# Patient Record
Sex: MALE | Race: OTHER | NOT HISPANIC OR LATINO | URBAN - METROPOLITAN AREA
[De-identification: names, ages, dates, MRNs, and addresses within clinical notes are randomized per-mention and may not be internally consistent; named-entity substitution may affect disease eponyms.]

---

## 2023-11-20 ENCOUNTER — INPATIENT (INPATIENT)
Facility: HOSPITAL | Age: 68
LOS: 1 days | Discharge: HOME HEALTH | DRG: 603 | End: 2023-11-22
Attending: INTERNAL MEDICINE | Admitting: INTERNAL MEDICINE
Payer: MEDICARE

## 2023-11-20 VITALS
TEMPERATURE: 98 F | SYSTOLIC BLOOD PRESSURE: 131 MMHG | OXYGEN SATURATION: 98 % | HEART RATE: 82 BPM | RESPIRATION RATE: 18 BRPM | DIASTOLIC BLOOD PRESSURE: 69 MMHG | WEIGHT: 207.9 LBS

## 2023-11-20 DIAGNOSIS — R65.10 SYSTEMIC INFLAMMATORY RESPONSE SYNDROME (SIRS) OF NON-INFECTIOUS ORIGIN WITHOUT ACUTE ORGAN DYSFUNCTION: ICD-10-CM

## 2023-11-20 DIAGNOSIS — Z86.39 PERSONAL HISTORY OF OTHER ENDOCRINE, NUTRITIONAL AND METABOLIC DISEASE: ICD-10-CM

## 2023-11-20 DIAGNOSIS — C61 MALIGNANT NEOPLASM OF PROSTATE: ICD-10-CM

## 2023-11-20 DIAGNOSIS — I10 ESSENTIAL (PRIMARY) HYPERTENSION: ICD-10-CM

## 2023-11-20 DIAGNOSIS — N18.9 CHRONIC KIDNEY DISEASE, UNSPECIFIED: ICD-10-CM

## 2023-11-20 DIAGNOSIS — M79.89 OTHER SPECIFIED SOFT TISSUE DISORDERS: ICD-10-CM

## 2023-11-20 DIAGNOSIS — L03.90 CELLULITIS, UNSPECIFIED: ICD-10-CM

## 2023-11-20 DIAGNOSIS — R09.89 OTHER SPECIFIED SYMPTOMS AND SIGNS INVOLVING THE CIRCULATORY AND RESPIRATORY SYSTEMS: ICD-10-CM

## 2023-11-20 DIAGNOSIS — Z29.9 ENCOUNTER FOR PROPHYLACTIC MEASURES, UNSPECIFIED: ICD-10-CM

## 2023-11-20 DIAGNOSIS — Z90.79 ACQUIRED ABSENCE OF OTHER GENITAL ORGAN(S): Chronic | ICD-10-CM

## 2023-11-20 DIAGNOSIS — I89.0 LYMPHEDEMA, NOT ELSEWHERE CLASSIFIED: ICD-10-CM

## 2023-11-20 LAB
ANION GAP SERPL CALC-SCNC: 10 MMOL/L — SIGNIFICANT CHANGE UP (ref 5–17)
ANION GAP SERPL CALC-SCNC: 10 MMOL/L — SIGNIFICANT CHANGE UP (ref 5–17)
BUN SERPL-MCNC: 27 MG/DL — HIGH (ref 7–23)
BUN SERPL-MCNC: 27 MG/DL — HIGH (ref 7–23)
CALCIUM SERPL-MCNC: 9.9 MG/DL — SIGNIFICANT CHANGE UP (ref 8.4–10.5)
CALCIUM SERPL-MCNC: 9.9 MG/DL — SIGNIFICANT CHANGE UP (ref 8.4–10.5)
CHLORIDE SERPL-SCNC: 106 MMOL/L — SIGNIFICANT CHANGE UP (ref 96–108)
CHLORIDE SERPL-SCNC: 106 MMOL/L — SIGNIFICANT CHANGE UP (ref 96–108)
CO2 SERPL-SCNC: 21 MMOL/L — LOW (ref 22–31)
CO2 SERPL-SCNC: 21 MMOL/L — LOW (ref 22–31)
CREAT SERPL-MCNC: 1.25 MG/DL — SIGNIFICANT CHANGE UP (ref 0.5–1.3)
CREAT SERPL-MCNC: 1.25 MG/DL — SIGNIFICANT CHANGE UP (ref 0.5–1.3)
CRP SERPL-MCNC: 105.3 MG/L — HIGH (ref 0–4)
CRP SERPL-MCNC: 105.3 MG/L — HIGH (ref 0–4)
EGFR: 62 ML/MIN/1.73M2 — SIGNIFICANT CHANGE UP
EGFR: 62 ML/MIN/1.73M2 — SIGNIFICANT CHANGE UP
GLUCOSE SERPL-MCNC: 140 MG/DL — HIGH (ref 70–99)
GLUCOSE SERPL-MCNC: 140 MG/DL — HIGH (ref 70–99)
HCT VFR BLD CALC: 36.8 % — LOW (ref 39–50)
HCT VFR BLD CALC: 36.8 % — LOW (ref 39–50)
HGB BLD-MCNC: 12.2 G/DL — LOW (ref 13–17)
HGB BLD-MCNC: 12.2 G/DL — LOW (ref 13–17)
MCHC RBC-ENTMCNC: 31 PG — SIGNIFICANT CHANGE UP (ref 27–34)
MCHC RBC-ENTMCNC: 31 PG — SIGNIFICANT CHANGE UP (ref 27–34)
MCHC RBC-ENTMCNC: 33.2 GM/DL — SIGNIFICANT CHANGE UP (ref 32–36)
MCHC RBC-ENTMCNC: 33.2 GM/DL — SIGNIFICANT CHANGE UP (ref 32–36)
MCV RBC AUTO: 93.4 FL — SIGNIFICANT CHANGE UP (ref 80–100)
MCV RBC AUTO: 93.4 FL — SIGNIFICANT CHANGE UP (ref 80–100)
NRBC # BLD: 0 /100 WBCS — SIGNIFICANT CHANGE UP (ref 0–0)
NRBC # BLD: 0 /100 WBCS — SIGNIFICANT CHANGE UP (ref 0–0)
PLATELET # BLD AUTO: 406 K/UL — HIGH (ref 150–400)
PLATELET # BLD AUTO: 406 K/UL — HIGH (ref 150–400)
POTASSIUM SERPL-MCNC: 4.9 MMOL/L — SIGNIFICANT CHANGE UP (ref 3.5–5.3)
POTASSIUM SERPL-MCNC: 4.9 MMOL/L — SIGNIFICANT CHANGE UP (ref 3.5–5.3)
POTASSIUM SERPL-SCNC: 4.9 MMOL/L — SIGNIFICANT CHANGE UP (ref 3.5–5.3)
POTASSIUM SERPL-SCNC: 4.9 MMOL/L — SIGNIFICANT CHANGE UP (ref 3.5–5.3)
RBC # BLD: 3.94 M/UL — LOW (ref 4.2–5.8)
RBC # BLD: 3.94 M/UL — LOW (ref 4.2–5.8)
RBC # FLD: 14.2 % — SIGNIFICANT CHANGE UP (ref 10.3–14.5)
RBC # FLD: 14.2 % — SIGNIFICANT CHANGE UP (ref 10.3–14.5)
SODIUM SERPL-SCNC: 137 MMOL/L — SIGNIFICANT CHANGE UP (ref 135–145)
SODIUM SERPL-SCNC: 137 MMOL/L — SIGNIFICANT CHANGE UP (ref 135–145)
WBC # BLD: 13 K/UL — HIGH (ref 3.8–10.5)
WBC # BLD: 13 K/UL — HIGH (ref 3.8–10.5)
WBC # FLD AUTO: 13 K/UL — HIGH (ref 3.8–10.5)
WBC # FLD AUTO: 13 K/UL — HIGH (ref 3.8–10.5)

## 2023-11-20 PROCEDURE — 93971 EXTREMITY STUDY: CPT | Mod: 26,LT

## 2023-11-20 PROCEDURE — 99285 EMERGENCY DEPT VISIT HI MDM: CPT

## 2023-11-20 PROCEDURE — 99223 1ST HOSP IP/OBS HIGH 75: CPT | Mod: GC

## 2023-11-20 PROCEDURE — 73701 CT LOWER EXTREMITY W/DYE: CPT | Mod: 26,LT,MA

## 2023-11-20 RX ORDER — HEPARIN SODIUM 5000 [USP'U]/ML
5000 INJECTION INTRAVENOUS; SUBCUTANEOUS EVERY 8 HOURS
Refills: 0 | Status: DISCONTINUED | OUTPATIENT
Start: 2023-11-20 | End: 2023-11-22

## 2023-11-20 RX ORDER — INFLUENZA VIRUS VACCINE 15; 15; 15; 15 UG/.5ML; UG/.5ML; UG/.5ML; UG/.5ML
0.7 SUSPENSION INTRAMUSCULAR ONCE
Refills: 0 | Status: DISCONTINUED | OUTPATIENT
Start: 2023-11-20 | End: 2023-11-22

## 2023-11-20 RX ORDER — SPIRONOLACTONE 25 MG/1
1 TABLET, FILM COATED ORAL
Refills: 0 | DISCHARGE

## 2023-11-20 RX ORDER — VANCOMYCIN HCL 1 G
1250 VIAL (EA) INTRAVENOUS ONCE
Refills: 0 | Status: COMPLETED | OUTPATIENT
Start: 2023-11-20 | End: 2023-11-20

## 2023-11-20 RX ORDER — METOPROLOL TARTRATE 50 MG
0 TABLET ORAL
Refills: 0 | DISCHARGE

## 2023-11-20 RX ORDER — SPIRONOLACTONE 25 MG/1
0 TABLET, FILM COATED ORAL
Refills: 0 | DISCHARGE

## 2023-11-20 RX ORDER — NIFEDIPINE 30 MG
2 TABLET, EXTENDED RELEASE 24 HR ORAL
Refills: 0 | DISCHARGE

## 2023-11-20 RX ORDER — SODIUM CHLORIDE 9 MG/ML
1000 INJECTION INTRAMUSCULAR; INTRAVENOUS; SUBCUTANEOUS ONCE
Refills: 0 | Status: COMPLETED | OUTPATIENT
Start: 2023-11-20 | End: 2023-11-20

## 2023-11-20 RX ORDER — PIPERACILLIN AND TAZOBACTAM 4; .5 G/20ML; G/20ML
3.38 INJECTION, POWDER, LYOPHILIZED, FOR SOLUTION INTRAVENOUS EVERY 8 HOURS
Refills: 0 | Status: DISCONTINUED | OUTPATIENT
Start: 2023-11-20 | End: 2023-11-22

## 2023-11-20 RX ORDER — METOPROLOL TARTRATE 50 MG
1 TABLET ORAL
Refills: 0 | DISCHARGE

## 2023-11-20 RX ORDER — SPIRONOLACTONE 25 MG/1
100 TABLET, FILM COATED ORAL DAILY
Refills: 0 | Status: DISCONTINUED | OUTPATIENT
Start: 2023-11-21 | End: 2023-11-22

## 2023-11-20 RX ORDER — VANCOMYCIN HCL 1 G
1500 VIAL (EA) INTRAVENOUS EVERY 12 HOURS
Refills: 0 | Status: COMPLETED | OUTPATIENT
Start: 2023-11-20 | End: 2023-11-21

## 2023-11-20 RX ORDER — PIPERACILLIN AND TAZOBACTAM 4; .5 G/20ML; G/20ML
3.38 INJECTION, POWDER, LYOPHILIZED, FOR SOLUTION INTRAVENOUS ONCE
Refills: 0 | Status: COMPLETED | OUTPATIENT
Start: 2023-11-20 | End: 2023-11-20

## 2023-11-20 RX ORDER — ASPIRIN/CALCIUM CARB/MAGNESIUM 324 MG
1 TABLET ORAL
Refills: 0 | DISCHARGE

## 2023-11-20 RX ADMIN — Medication 300 MILLIGRAM(S): at 23:05

## 2023-11-20 RX ADMIN — PIPERACILLIN AND TAZOBACTAM 25 GRAM(S): 4; .5 INJECTION, POWDER, LYOPHILIZED, FOR SOLUTION INTRAVENOUS at 21:11

## 2023-11-20 RX ADMIN — HEPARIN SODIUM 5000 UNIT(S): 5000 INJECTION INTRAVENOUS; SUBCUTANEOUS at 21:12

## 2023-11-20 RX ADMIN — SODIUM CHLORIDE 1000 MILLILITER(S): 9 INJECTION INTRAMUSCULAR; INTRAVENOUS; SUBCUTANEOUS at 10:25

## 2023-11-20 RX ADMIN — PIPERACILLIN AND TAZOBACTAM 200 GRAM(S): 4; .5 INJECTION, POWDER, LYOPHILIZED, FOR SOLUTION INTRAVENOUS at 10:25

## 2023-11-20 RX ADMIN — Medication 166.67 MILLIGRAM(S): at 11:42

## 2023-11-20 NOTE — H&P ADULT - PROBLEM/PLAN-6
PAST SURGICAL HISTORY:  No significant past surgical history     No significant past surgical history       
DISPLAY PLAN FREE TEXT

## 2023-11-20 NOTE — H&P ADULT - ASSESSMENT
69yo male with PMHx chronic lymphadema, prostate ca (s/p proastectomy, radiation and hormone therapy), HTN, hyperaldosteronism (endo Dr. Suad Feldman) presents with ~1 week left lower leg swelling, erythema and tenderness, admitted for management of cellulitis.

## 2023-11-20 NOTE — H&P ADULT - PROBLEM SELECTOR PLAN 3
1/4 SIRS (leukocytosis) likely in setting of cellulitis. Reportedly had fever at home however no fever in ED    - f/u blood cultures  - abx as above

## 2023-11-20 NOTE — ED PROVIDER NOTE - CLINICAL SUMMARY MEDICAL DECISION MAKING FREE TEXT BOX
Pt c/o fever ~ 1 wk ago, afebrile in ed w LLE swelling, drainage.  LLE w cellulitis and bullae, poss crepitus on exam - exam concerning for ?able nec fasciitis 2/2 bullae and poss crepitus but well appearing, no ttp or c/o pain and no fever which is inconsistent.  Pt ordered for abx for skin infection; plan labs, ct LLE, admit for iv abx after failing outpt abx.

## 2023-11-20 NOTE — ED ADULT NURSE NOTE - NSFALLHARMRISKINTERV_ED_ALL_ED

## 2023-11-20 NOTE — PATIENT PROFILE ADULT - FUNCTIONAL ASSESSMENT - DAILY ACTIVITY 5.
Alert-The patient is alert, awake and responds to voice. The patient is oriented to time, place, and person. The triage nurse is able to obtain subjective information. 4 = No assist / stand by assistance

## 2023-11-20 NOTE — H&P ADULT - HISTORY OF PRESENT ILLNESS
CC: left leg swelling, pain and redness  HPI: 69yo male with PMHx chronic lymphadema, prostate ca (s/p proastectomy, radiation and hormone therapy), HTN, hyperaldosteronism (endo Dr. Suad Feldman) presents with ~1 week left lower leg swelling, erythema and tenderness. Pt reports normally wears compression stockings and notes possibly some trauma from removing his left stocking at some point resulting in left lower medial leg wound however also developed large bullae with yellow filled fluid which has since popped. Endorsed fever and chills last week and went to urgentcare on saturday and was prescribed keflex which patient took for 2 days and reports some improvement of swelling however came to ED since patient expected more improvement on antibiotics. Currently no fever or chills and believes that the antibiotics given in ED are helping with swelling. Denies headache, dizziness, chest pain, SOB, abd pain, dysuria.    In the ED:    - VS: Tmax: 97.3, HR: 83, BP: 113/57, RR: 18, O2: 99% room air     - Pertinent Labs: Hb 13, Hb 12.2, bicarb 21, BUN 27, Cr 1.25    - Imaging: CT left LE: Subcutaneous soft tissue edema throughout the left lower extremity that is most prominent at the level of the ankle. Possibly related to cellulitis. No encapsulated peripherally enhancing fluid collection. No soft tissue gas to suggest the presence of necrotizing fasciitis.    - Treatment/interventions: vanc 1250mg, zosyn 3.375g, 1L NS

## 2023-11-20 NOTE — H&P ADULT - PROBLEM SELECTOR PLAN 4
History chronic lymphedema and wears compression stockings daily however has not been able to fit left compression stockings due to swelling    - Vascular consult given severe left lower leg swelling with history of lymphedema  - bilateral leg elevation and continue right leg compression stockings  - DVT left lower leg History chronic lymphedema and wears compression stockings daily however has not been able to fit left compression stockings due to swelling    - Vascular consult given severe left lower leg swelling with history of lymphedema  - bilateral leg elevation and continue right leg compression stockings and ACE bandage of left  - DVT left lower leg

## 2023-11-20 NOTE — H&P ADULT - ATTENDING COMMENTS
Patient presenting with left leg swelling and discharge. Denies fevers, no chills, reports chronic LLE lymphedema, he notice bullae after a cut about a week ago, 2 days ago he went to urgent care was prescribed Keflex. In view of persistent symptoms, he presented to ED  General: AOX3, NAD, lying in stretcher, speaking in full sentences, no labored breathing on RA  HEENT: AT/NC, no facial asymmetry  Abdomen: soft, non-tender  Extremities: Left leg edema, with erythema and warmth, no crepitus to palpation, no tenderness, no focal deficit.       Labs, imaging reviewed    Purulent cellulitis  Lymphedema  Prostate CA    Continue on Pip/Tazo/Vancomycin, get ESR/CRP, Vascular evaluation, wound care per Vascular. Follow-up Bcx. Doppler US LE  Monitor creatinine  Medication reconciliation, avoid BP overcontrol  DVT ppx

## 2023-11-20 NOTE — H&P ADULT - PROBLEM SELECTOR PLAN 2
~1 week of significant left leg swelling, 2-3+ pitting edema likely 2/2 cellulitis in addition to history of lymphadema however pt notes has been less physically active with less ambulation and has history of malignancy    - Duplex left lower leg to r/o DVT ~1 week of significant left leg swelling, 2-3+ pitting edema likely 2/2 cellulitis in addition to history of lymphedema however pt notes has been less physically active with less ambulation and has history of malignancy    - Duplex left lower leg to r/o DVT  - vascular consult  - leg elevation ~1 week of significant left leg swelling, 2-3+ pitting edema likely 2/2 cellulitis in addition to history of lymphedema however pt notes has been less physically active with less ambulation and has history of malignancy    - Duplex left lower leg to r/o DVT  - vascular consult  - leg elevation and continue compression stockings for right leg ~1 week of significant left leg swelling, 2-3+ pitting edema likely 2/2 cellulitis in addition to history of lymphedema however pt notes has been less physically active with less ambulation and has history of malignancy    - Duplex left lower leg to r/o DVT  - vascular consult  - leg elevation and continue compression stockings for right leg and ACE bandage of left

## 2023-11-20 NOTE — H&P ADULT - NSHPPHYSICALEXAM_GEN_ALL_CORE
PHYSICAL EXAM:  GENERAL: Pt lying in bed comfortably in NAD  HEAD:  Atraumatic   EYES: EOMI, PERRL, conjunctiva and sclera clear  ENT: Moist mucous membranes  NECK: Supple, No JVD  CHEST/LUNG: Clear to auscultation bilaterally; No rales, rhonchi, wheezing or rubs. Unlabored respirations  HEART: Regular rate and rhythm; No murmurs, rubs, or gallops  ABDOMEN: Bowel sounds present; Soft, Nontender, Nondistended. No guarding or rigidity    EXTREMITIES:  2+ Peripheral Pulses, brisk capillary refill. No clubbing, cyanosis, or edema  NERVOUS SYSTEM:  Alert & Oriented X3, speech clear. Answers questions appropriately. Facial movements symmetrical, no facial droop, tongue protrusion midline. Full and equal 5/5 strength B/L upper and lower extremities. Sensation intact. No motor drift. No deficits   MSK: FROM x 4 extremities   SKIN: left lower leg erythema, warmth, and mild tenderness from foot to knee, left lower medial leg with 2cm x 4cm superficial pink healing wound and superior with area of popped previous bullae with surrounding 1cm x 1cm small yellow filled bullae

## 2023-11-20 NOTE — ED PROVIDER NOTE - NSICDXPASTMEDICALHX_GEN_ALL_CORE_FT
PAST MEDICAL HISTORY:  H/O hyperaldosteronism     HTN (hypertension)     Lymphedema     Prostate cancer

## 2023-11-20 NOTE — ED PROVIDER NOTE - PHYSICAL EXAMINATION
VITAL SIGNS: I have reviewed nursing notes and confirm.  CONSTITUTIONAL:  in no acute distress.   SKIN:  warm and dry, no acute rash.   HEAD:  normocephalic, atraumatic.  EYES: PERRL, EOM intact; conjunctiva and sclera clear.  ENT: No nasal discharge; airway clear.   NECK: Supple; non tender.  CARD: S1, S2 normal; no murmurs, gallops, or rubs. Regular rate and rhythm.   RESP:  Clear to auscultation b/l, no wheezes, rales or rhonchi.  ABD: Normal bowel sounds; soft; non-distended; non-tender; no guarding/ rebound.  MSK: Normal ROM. No clubbing, cyanosis. no ttp bilat le LLE - dp 1+, mild 5 x8 cm patch of skin peeling mid/lower medial thigh, 4+ edema knee to foot, erythema, warmth w/o ttp 1/3 below knee to foot, scattered bullae medial and ant lower 1/2 of lower leg, + serous drainage, ?able crepitus, no ttp, + from  NEURO: Alert, oriented, grossly unremarkable  PSYCH: Cooperative, mood and affect appropriate.

## 2023-11-20 NOTE — H&P ADULT - PROBLEM SELECTOR PLAN 9
Fluids: s/p 1L NS  Electrolytes: Replete to keep K>4 and Mg>2  Nutrition: DASH  DVT ppx: Heparin  Dispo: F

## 2023-11-20 NOTE — H&P ADULT - PROBLEM SELECTOR PLAN 7
On home nifedipine 120mg daily and metoprolol. Normotensive at this time    - Restart home BP needed, normotensive at this time On home nifedipine 120mg daily and metoprolol. Normotensive at this time    - Restart home BP as needed, normotensive at this time On home nifedipine 120mg daily and metoprolol. Normotensive at this time    - Restart home BP as needed, normotensive at this time    #Alcohol use disorder  Drinks 1-2L wine regularly however in past week has not had any alcohol. No withdrawal symptoms and never had withdrawals in past. Low suspicion for withdrawal as last drank 1 week ago  - Education and counseling provided on alcohol reduction On home nifedipine 120mg daily and metoprolol succ 25mg daily. Normotensive at this time    - Restart home BP as needed, normotensive at this time    #Alcohol use disorder  Drinks 1-2L wine regularly however in past week has not had any alcohol. No withdrawal symptoms and never had withdrawals in past. Low suspicion for withdrawal as last drank 1 week ago  - Education and counseling provided on alcohol reduction

## 2023-11-20 NOTE — H&P ADULT - PROBLEM SELECTOR PLAN 8
Pt reports was told his creatinine has been elevated in past however unsure of baseline. Cr 1.25 on admission. Pt endorses poor PO intake over last week. Likely dehydrated. Received 1L NS    - Trend Cr  - Avoid nephrotoxic medication  - Encourage PO hydration and intake

## 2023-11-20 NOTE — ED ADULT TRIAGE NOTE - CHIEF COMPLAINT QUOTE
pt w/ hx lymphedema presents to ER c/o L lower extremity redness, swelling, with open wound for the past week. states he had a fever a week ago but denies any fevers over the past week.

## 2023-11-20 NOTE — H&P ADULT - NSHPSOCIALHISTORY_GEN_ALL_CORE
Lives at home with wife. No smoking. Drinks 1-2L wine daily however has not drank any wine in past week. Smokes marijuana. Currently works as  for construction.

## 2023-11-20 NOTE — H&P ADULT - PROBLEM SELECTOR PLAN 6
On home spironolactone. Follows endo Dr. Suad Feldman    - continue spironolactone On home spironolactone 100mg daily. Follows tracey Feldman    - continue spironolactone 100mg daily

## 2023-11-20 NOTE — H&P ADULT - NSHPLABSRESULTS_GEN_ALL_CORE
LABS:  casandra                        12.2   13.00 )-----------( 406      ( 20 Nov 2023 10:07 )             36.8     11-20    137  |  106  |  27<H>  ----------------------------<  140<H>  4.9   |  21<L>  |  1.25    Ca    9.9      20 Nov 2023 10:07

## 2023-11-20 NOTE — H&P ADULT - PROBLEM SELECTOR PLAN 5
s/p proastectomy, radiation and hormone therapy, follows a urologist at [facility]    - outpatient follow up

## 2023-11-20 NOTE — PATIENT PROFILE ADULT - MONEY FOR FOOD
At rad onc consult, spoke with pt and son re: ACS programs, resources, and role of ACS navigator. Pt stated that he has no needs at this time but would follow up if needs arise. Provided with ACS navigator's contact information.
no

## 2023-11-20 NOTE — H&P ADULT - PROBLEM SELECTOR PLAN 1
~1 week duration of left lower leg swelling, erythema, tenderness with fever and chills, started on keflex from urgentcare for past 2 days presenting with persistent swelling, erythema, and pain. On exam left leg with 2-3+ pitting edema with significant erythema and warmth with left lower medial leg multiple healing wound with small bullae with yellow fluid (previous very large bullae with yellow fluid that has since popped). No fever or chills at this time and meeting 1/4 SIRS criteria (leukocytosis). Given vanc and zosyn in ED    - continue vanc 1250mg q12h and zosyn 3.375g q8h  - follow up blood culture  - duplex left lower leg ~1 week duration of left lower leg swelling, erythema with fever and chills, started on keflex from urgentcare for past 2 days presenting with persistent swelling, erythema, and pain. On exam left leg with 2-3+ pitting edema with significant erythema and warmth with left lower medial leg multiple healing wound with small bullae with yellow fluid (previous very large bullae with yellow fluid that has since popped). No fever or chills at this time and meeting 1/4 SIRS criteria (leukocytosis). Given vanc and zosyn in ED    - continue vanc 1250mg q12h and zosyn 3.375g q8h  - follow up blood culture  - duplex left lower leg ~1 week duration of left lower leg swelling, erythema with fever and chills, started on keflex from urgentcare for past 2 days presenting with persistent swelling, erythema, and pain. On exam left leg with 2-3+ pitting edema with significant erythema and warmth with left lower medial leg multiple healing wound with small bullae with yellow fluid (previous very large bullae with yellow fluid that has since popped). No fever or chills at this time and meeting 1/4 SIRS criteria (leukocytosis). Given vanc and zosyn in ED    - continue vanc 1250mg q12h and zosyn 3.375g q8h  - follow up blood culture  - duplex left lower leg  - wound dressing care ~1 week duration of left lower leg swelling, erythema with fever and chills, started on keflex from urgentcare for past 2 days presenting with persistent swelling, erythema, and pain. On exam left leg with 2-3+ pitting edema with significant erythema and warmth with left lower medial leg multiple healing wound with small bullae with yellow fluid (previous very large bullae with yellow fluid that has since popped). No fever or chills at this time and meeting 1/4 SIRS criteria (leukocytosis). Given vanc and zosyn in ED    - continue vanc 1250mg q12h and zosyn 3.375g q8h  - Encourage PO hydration  - follow up blood culture  - duplex left lower leg  - wound dressing care per vascular ~1 week duration of left lower leg swelling, erythema with fever and chills, started on keflex from urgentcare for past 2 days presenting with persistent swelling, erythema, and pain. On exam left leg with 2-3+ pitting edema with significant erythema and warmth with left lower medial leg multiple healing wound with small bullae with yellow fluid (previous very large bullae with yellow fluid that has since popped). No fever or chills at this time and meeting 1/4 SIRS criteria (leukocytosis). CT negative for gas. Given vanc and zosyn in ED    - continue vanc 1250mg q12h and zosyn 3.375g q8h  - Encourage PO hydration  - follow up blood culture  - duplex left lower leg  - wound dressing care per vascular  - f/u ESR/CRP

## 2023-11-20 NOTE — CONSULT NOTE ADULT - SUBJECTIVE AND OBJECTIVE BOX
Vascular Attending:  Dr. Nash    HPI: 68M w/ PMHx chronic lymphedema secondary to prostate resection, hyperaldosteronism, HTN, admitted for management of LLE swelling, redness and drainage with fever at home. Patient reports over the last week he has had increased drainage, redness and discomfort of his LLE. While he typically uses compression stockings, he has been unable to fit into them. He also reports clear bullae which have formed but denies pain of the leg. The fever occured about 8 days ago and he has not had fever since. He went to see an urgent care doctor and was started on keflex.       PAST MEDICAL & SURGICAL HISTORY:  Lymphedema      H/O hyperaldosteronism      HTN (hypertension)      Prostate cancer      S/P prostatectomy          REVIEW OF SYSTEMS    Negative except as above      MEDICATIONS  (STANDING):    MEDICATIONS  (PRN):      Allergies    No Known Allergies    Intolerances        SOCIAL HISTORY:    FAMILY HISTORY:      Vital Signs Last 24 Hrs  T(C): 36.3 (20 Nov 2023 16:42), Max: 36.8 (20 Nov 2023 09:02)  T(F): 97.3 (20 Nov 2023 16:42), Max: 98.2 (20 Nov 2023 09:02)  HR: 83 (20 Nov 2023 16:42) (69 - 83)  BP: 113/57 (20 Nov 2023 16:42) (113/57 - 131/69)  BP(mean): --  RR: 18 (20 Nov 2023 16:42) (17 - 18)  SpO2: 99% (20 Nov 2023 16:42) (97% - 99%)    Parameters below as of 20 Nov 2023 16:42  Patient On (Oxygen Delivery Method): room air    PHYSICAL EXAM:      LABS:                        12.2   13.00 )-----------( 406      ( 20 Nov 2023 10:07 )             36.8     11-20    137  |  106  |  27<H>  ----------------------------<  140<H>  4.9   |  21<L>  |  1.25    Ca    9.9      20 Nov 2023 10:07        Urinalysis Basic - ( 20 Nov 2023 10:07 )    Color: x / Appearance: x / SG: x / pH: x  Gluc: 140 mg/dL / Ketone: x  / Bili: x / Urobili: x   Blood: x / Protein: x / Nitrite: x   Leuk Esterase: x / RBC: x / WBC x   Sq Epi: x / Non Sq Epi: x / Bacteria: x        RADIOLOGY & ADDITIONAL STUDIES Vascular Attending:  Dr. Nash    HPI: 68M w/ PMHx chronic lymphedema he suspects is secondary to his prostate resection, hyperaldosteronism, HTN, admitted for management of LLE swelling, redness and drainage with fever at home. Patient reports about a week ago he had what he suspected was a viral infection with fatigue and fever. A day or two after, he was putting on his compression stockings and he tore some skin on his left calf, leaving him with an area of denuded tissue. He has been caring for the wound but noted over the last several days pain, redness, warmth and drainage from his wound. He also has had progression of bullae in the area. While he typically uses compression stockings, he has been unable to fit into them during this time. He went to an urgent care and was prescribed Keflex. After two days, he thinks his wounds/blisters are more dry but his pain did not improve so he came to the ED. He states he has not previously had cellulitis of this leg. He is good about compression adherence and has previously been seen in a lymphedema clinic but does not regularly see therapists there because he essentially has no ongoing needs and has been successful at managing his compression/skincare on his own.     PAST MEDICAL & SURGICAL HISTORY:  Lymphedema      H/O hyperaldosteronism      HTN (hypertension)      Prostate cancer      S/P prostatectomy          REVIEW OF SYSTEMS    Negative except as above      MEDICATIONS  (STANDING):    MEDICATIONS  (PRN):      Allergies    No Known Allergies    Intolerances    SOCIAL HISTORY: No tobacco. Daily etoh. Works as a , primarily at a desk.     FAMILY HISTORY:      Vital Signs Last 24 Hrs  T(C): 36.3 (20 Nov 2023 16:42), Max: 36.8 (20 Nov 2023 09:02)  T(F): 97.3 (20 Nov 2023 16:42), Max: 98.2 (20 Nov 2023 09:02)  HR: 83 (20 Nov 2023 16:42) (69 - 83)  BP: 113/57 (20 Nov 2023 16:42) (113/57 - 131/69)  BP(mean): --  RR: 18 (20 Nov 2023 16:42) (17 - 18)  SpO2: 99% (20 Nov 2023 16:42) (97% - 99%)    Parameters below as of 20 Nov 2023 16:42  Patient On (Oxygen Delivery Method): room air    PHYSICAL EXAM:  General: Well appearing, nonseptic  CV: HDS, WWP  Pulm: On room air, no effort  Abd: S/nt/nd  Extremity: LLE w/ pitting edema to midthigh. Erythema extending from calf to distal thigh. Some flaking skin of thigh. Territorial denuded tissue around medial malleolus with scattered bullae which have dried and solidified to a gel. 4cm x 2cm clean ulcer of posterior calf w/o purulence. No fluctuance or induration.   vascular: Vascular:                 R            L   Fem      2+          2+      Pop      2+           2+         DP        2+           2+           PT         2+          2+     LABS:                        12.2   13.00 )-----------( 406      ( 20 Nov 2023 10:07 )             36.8     11-20    137  |  106  |  27<H>  ----------------------------<  140<H>  4.9   |  21<L>  |  1.25    Ca    9.9      20 Nov 2023 10:07      Urinalysis Basic - ( 20 Nov 2023 10:07 )    Color: x / Appearance: x / SG: x / pH: x  Gluc: 140 mg/dL / Ketone: x  / Bili: x / Urobili: x   Blood: x / Protein: x / Nitrite: x   Leuk Esterase: x / RBC: x / WBC x   Sq Epi: x / Non Sq Epi: x / Bacteria: x    RADIOLOGY & ADDITIONAL STUDIES    CT LLE w/ IV contrast    IMPRESSION:    Subcutaneous soft tissue edema throughout the left lower extremity that is most prominent at the level of the ankle. Possibly related to cellulitis. No encapsulated peripherally enhancing fluid collection. No soft tissue gas to suggest the presence of necrotizing fasciitis.    --- End of Report ---    A/P: 68M w/ PMHx chronic lymphedema who presents with suspected uncomplicated cellulitis of the LLE superimposed on lymphedema. CT scan w/o gas or fluid collections.     - No acute vascular surgery intervention.  - Wound care instructions: Apply Xeroform to wet areas along medial distal calf. Wrap leg from toes to thigh with Kerlix. Apply compression by wrapping from foot to thigh with ace bandage. Please have bedside RN change daily or PRN for saturation.   - Vascular surgery (Team 3) will continue to follow. Please call if we can be of assistance. The phone number is [phone] and we can be reached there 24/7.     Discussed w/ Chief. Staffed w/ Dr. Nash

## 2023-11-20 NOTE — ED ADULT NURSE NOTE - OBJECTIVE STATEMENT
Pt aaox3 c/o left lower extremity swelling, redness, and a leaking open wound on the ankle for 1x wk. Pt denies any N/V/D, fever, chills, SOB, or CP. Pmhx of lymphedema. Pt aaox3 c/o lower extremity swelling, redness, and  leaking open wound on the ankle for 1x wk. Pt states he had a "bubble" that tore when he was taking his compression socks off. Pt denies any N/V/D, fever, chills, SOB, or CP. Pmhx of lymphedema. Pt aaox3 c/o left lower extremity swelling, redness, and  leaking open wound on the ankle for 1x wk. Pt states he had a "bubble" that tore when he was taking his compression socks off. Pt denies any N/V/D, fever, chills, SOB, or CP. Pmhx of lymphedema.

## 2023-11-20 NOTE — ED PROVIDER NOTE - OBJECTIVE STATEMENT
67 yo M  h/o hyperaldosteronism, htn, lymphedema as a complication from his prostate surgery, prostate ca s/p prostatectomy, chemo, xrt c/o LLE swelling, redness and drainage.  Pt reports fever 102.6 8 d ago w/o associated symptoms.  Pt states he scraped his LLE when removing his compression stocking 6 d ago.  Since then, he could not use his compression stocking and noted onset of a large, yellow fluid filled bullae on L lower medial calf that has ruptured followed by onset of similar but smaller bullae/blisters, redness, increased swelling.  No sig pain.  Pt also notes some peeling of skin on his mid/lower medial thigh.  No further fever, n/v/d.  No uri sx, cough, sob, cp, urinary sx.  Pt went to [facility] and was started on keflex 2 d ago w/o improvement in sx. You can access the FollowMyHealth Patient Portal offered by [facility] by registering at the following website: [email or web address]. By joining [facility]’s FollowMyHealth portal, you will also be able to view your health information using other applications (apps) compatible with our system.

## 2023-11-21 ENCOUNTER — TRANSCRIPTION ENCOUNTER (OUTPATIENT)
Age: 68
End: 2023-11-21

## 2023-11-21 LAB
ANION GAP SERPL CALC-SCNC: 10 MMOL/L — SIGNIFICANT CHANGE UP (ref 5–17)
ANION GAP SERPL CALC-SCNC: 10 MMOL/L — SIGNIFICANT CHANGE UP (ref 5–17)
BASOPHILS # BLD AUTO: 0.09 K/UL — SIGNIFICANT CHANGE UP (ref 0–0.2)
BASOPHILS # BLD AUTO: 0.09 K/UL — SIGNIFICANT CHANGE UP (ref 0–0.2)
BASOPHILS NFR BLD AUTO: 0.9 % — SIGNIFICANT CHANGE UP (ref 0–2)
BASOPHILS NFR BLD AUTO: 0.9 % — SIGNIFICANT CHANGE UP (ref 0–2)
BUN SERPL-MCNC: 15 MG/DL — SIGNIFICANT CHANGE UP (ref 7–23)
BUN SERPL-MCNC: 15 MG/DL — SIGNIFICANT CHANGE UP (ref 7–23)
CALCIUM SERPL-MCNC: 9.6 MG/DL — SIGNIFICANT CHANGE UP (ref 8.4–10.5)
CALCIUM SERPL-MCNC: 9.6 MG/DL — SIGNIFICANT CHANGE UP (ref 8.4–10.5)
CHLORIDE SERPL-SCNC: 104 MMOL/L — SIGNIFICANT CHANGE UP (ref 96–108)
CHLORIDE SERPL-SCNC: 104 MMOL/L — SIGNIFICANT CHANGE UP (ref 96–108)
CO2 SERPL-SCNC: 23 MMOL/L — SIGNIFICANT CHANGE UP (ref 22–31)
CO2 SERPL-SCNC: 23 MMOL/L — SIGNIFICANT CHANGE UP (ref 22–31)
CREAT SERPL-MCNC: 1 MG/DL — SIGNIFICANT CHANGE UP (ref 0.5–1.3)
CREAT SERPL-MCNC: 1 MG/DL — SIGNIFICANT CHANGE UP (ref 0.5–1.3)
CRP SERPL-MCNC: 68.9 MG/L — HIGH (ref 0–4)
CRP SERPL-MCNC: 68.9 MG/L — HIGH (ref 0–4)
EGFR: 81 ML/MIN/1.73M2 — SIGNIFICANT CHANGE UP
EGFR: 81 ML/MIN/1.73M2 — SIGNIFICANT CHANGE UP
EOSINOPHIL # BLD AUTO: 0.22 K/UL — SIGNIFICANT CHANGE UP (ref 0–0.5)
EOSINOPHIL # BLD AUTO: 0.22 K/UL — SIGNIFICANT CHANGE UP (ref 0–0.5)
EOSINOPHIL NFR BLD AUTO: 2.1 % — SIGNIFICANT CHANGE UP (ref 0–6)
EOSINOPHIL NFR BLD AUTO: 2.1 % — SIGNIFICANT CHANGE UP (ref 0–6)
ERYTHROCYTE [SEDIMENTATION RATE] IN BLOOD: 97 MM/HR — HIGH
ERYTHROCYTE [SEDIMENTATION RATE] IN BLOOD: 97 MM/HR — HIGH
GLUCOSE SERPL-MCNC: 141 MG/DL — HIGH (ref 70–99)
GLUCOSE SERPL-MCNC: 141 MG/DL — HIGH (ref 70–99)
HCT VFR BLD CALC: 34.4 % — LOW (ref 39–50)
HCT VFR BLD CALC: 34.4 % — LOW (ref 39–50)
HCV AB S/CO SERPL IA: 0.04 S/CO — SIGNIFICANT CHANGE UP
HCV AB S/CO SERPL IA: 0.04 S/CO — SIGNIFICANT CHANGE UP
HCV AB SERPL-IMP: SIGNIFICANT CHANGE UP
HCV AB SERPL-IMP: SIGNIFICANT CHANGE UP
HGB BLD-MCNC: 11.2 G/DL — LOW (ref 13–17)
HGB BLD-MCNC: 11.2 G/DL — LOW (ref 13–17)
IMM GRANULOCYTES NFR BLD AUTO: 1.4 % — HIGH (ref 0–0.9)
IMM GRANULOCYTES NFR BLD AUTO: 1.4 % — HIGH (ref 0–0.9)
LYMPHOCYTES # BLD AUTO: 1.95 K/UL — SIGNIFICANT CHANGE UP (ref 1–3.3)
LYMPHOCYTES # BLD AUTO: 1.95 K/UL — SIGNIFICANT CHANGE UP (ref 1–3.3)
LYMPHOCYTES # BLD AUTO: 18.6 % — SIGNIFICANT CHANGE UP (ref 13–44)
LYMPHOCYTES # BLD AUTO: 18.6 % — SIGNIFICANT CHANGE UP (ref 13–44)
MAGNESIUM SERPL-MCNC: 1.9 MG/DL — SIGNIFICANT CHANGE UP (ref 1.6–2.6)
MAGNESIUM SERPL-MCNC: 1.9 MG/DL — SIGNIFICANT CHANGE UP (ref 1.6–2.6)
MCHC RBC-ENTMCNC: 31 PG — SIGNIFICANT CHANGE UP (ref 27–34)
MCHC RBC-ENTMCNC: 31 PG — SIGNIFICANT CHANGE UP (ref 27–34)
MCHC RBC-ENTMCNC: 32.6 GM/DL — SIGNIFICANT CHANGE UP (ref 32–36)
MCHC RBC-ENTMCNC: 32.6 GM/DL — SIGNIFICANT CHANGE UP (ref 32–36)
MCV RBC AUTO: 95.3 FL — SIGNIFICANT CHANGE UP (ref 80–100)
MCV RBC AUTO: 95.3 FL — SIGNIFICANT CHANGE UP (ref 80–100)
MONOCYTES # BLD AUTO: 0.61 K/UL — SIGNIFICANT CHANGE UP (ref 0–0.9)
MONOCYTES # BLD AUTO: 0.61 K/UL — SIGNIFICANT CHANGE UP (ref 0–0.9)
MONOCYTES NFR BLD AUTO: 5.8 % — SIGNIFICANT CHANGE UP (ref 2–14)
MONOCYTES NFR BLD AUTO: 5.8 % — SIGNIFICANT CHANGE UP (ref 2–14)
NEUTROPHILS # BLD AUTO: 7.44 K/UL — HIGH (ref 1.8–7.4)
NEUTROPHILS # BLD AUTO: 7.44 K/UL — HIGH (ref 1.8–7.4)
NEUTROPHILS NFR BLD AUTO: 71.2 % — SIGNIFICANT CHANGE UP (ref 43–77)
NEUTROPHILS NFR BLD AUTO: 71.2 % — SIGNIFICANT CHANGE UP (ref 43–77)
NRBC # BLD: 0 /100 WBCS — SIGNIFICANT CHANGE UP (ref 0–0)
NRBC # BLD: 0 /100 WBCS — SIGNIFICANT CHANGE UP (ref 0–0)
PHOSPHATE SERPL-MCNC: 4.1 MG/DL — SIGNIFICANT CHANGE UP (ref 2.5–4.5)
PHOSPHATE SERPL-MCNC: 4.1 MG/DL — SIGNIFICANT CHANGE UP (ref 2.5–4.5)
PLATELET # BLD AUTO: 372 K/UL — SIGNIFICANT CHANGE UP (ref 150–400)
PLATELET # BLD AUTO: 372 K/UL — SIGNIFICANT CHANGE UP (ref 150–400)
POTASSIUM SERPL-MCNC: 4.6 MMOL/L — SIGNIFICANT CHANGE UP (ref 3.5–5.3)
POTASSIUM SERPL-MCNC: 4.6 MMOL/L — SIGNIFICANT CHANGE UP (ref 3.5–5.3)
POTASSIUM SERPL-SCNC: 4.6 MMOL/L — SIGNIFICANT CHANGE UP (ref 3.5–5.3)
POTASSIUM SERPL-SCNC: 4.6 MMOL/L — SIGNIFICANT CHANGE UP (ref 3.5–5.3)
RBC # BLD: 3.61 M/UL — LOW (ref 4.2–5.8)
RBC # BLD: 3.61 M/UL — LOW (ref 4.2–5.8)
RBC # FLD: 14.3 % — SIGNIFICANT CHANGE UP (ref 10.3–14.5)
RBC # FLD: 14.3 % — SIGNIFICANT CHANGE UP (ref 10.3–14.5)
SODIUM SERPL-SCNC: 137 MMOL/L — SIGNIFICANT CHANGE UP (ref 135–145)
SODIUM SERPL-SCNC: 137 MMOL/L — SIGNIFICANT CHANGE UP (ref 135–145)
VANCOMYCIN TROUGH SERPL-MCNC: 15.6 UG/ML — SIGNIFICANT CHANGE UP (ref 10–20)
VANCOMYCIN TROUGH SERPL-MCNC: 15.6 UG/ML — SIGNIFICANT CHANGE UP (ref 10–20)
WBC # BLD: 10.46 K/UL — SIGNIFICANT CHANGE UP (ref 3.8–10.5)
WBC # BLD: 10.46 K/UL — SIGNIFICANT CHANGE UP (ref 3.8–10.5)
WBC # FLD AUTO: 10.46 K/UL — SIGNIFICANT CHANGE UP (ref 3.8–10.5)
WBC # FLD AUTO: 10.46 K/UL — SIGNIFICANT CHANGE UP (ref 3.8–10.5)

## 2023-11-21 PROCEDURE — 99233 SBSQ HOSP IP/OBS HIGH 50: CPT

## 2023-11-21 RX ORDER — VANCOMYCIN HCL 1 G
1500 VIAL (EA) INTRAVENOUS EVERY 12 HOURS
Refills: 0 | Status: DISCONTINUED | OUTPATIENT
Start: 2023-11-21 | End: 2023-11-22

## 2023-11-21 RX ORDER — ACETAMINOPHEN 500 MG
1000 TABLET ORAL EVERY 6 HOURS
Refills: 0 | Status: DISCONTINUED | OUTPATIENT
Start: 2023-11-21 | End: 2023-11-22

## 2023-11-21 RX ORDER — CEPHALEXIN 500 MG
1 CAPSULE ORAL
Qty: 32 | Refills: 0
Start: 2023-11-21 | End: 2023-11-28

## 2023-11-21 RX ORDER — ACETAMINOPHEN 500 MG
975 TABLET ORAL ONCE
Refills: 0 | Status: COMPLETED | OUTPATIENT
Start: 2023-11-21 | End: 2023-11-21

## 2023-11-21 RX ADMIN — HEPARIN SODIUM 5000 UNIT(S): 5000 INJECTION INTRAVENOUS; SUBCUTANEOUS at 06:08

## 2023-11-21 RX ADMIN — PIPERACILLIN AND TAZOBACTAM 25 GRAM(S): 4; .5 INJECTION, POWDER, LYOPHILIZED, FOR SOLUTION INTRAVENOUS at 21:48

## 2023-11-21 RX ADMIN — SPIRONOLACTONE 100 MILLIGRAM(S): 25 TABLET, FILM COATED ORAL at 06:08

## 2023-11-21 RX ADMIN — Medication 300 MILLIGRAM(S): at 10:33

## 2023-11-21 RX ADMIN — Medication 1000 MILLIGRAM(S): at 19:13

## 2023-11-21 RX ADMIN — PIPERACILLIN AND TAZOBACTAM 25 GRAM(S): 4; .5 INJECTION, POWDER, LYOPHILIZED, FOR SOLUTION INTRAVENOUS at 13:55

## 2023-11-21 RX ADMIN — HEPARIN SODIUM 5000 UNIT(S): 5000 INJECTION INTRAVENOUS; SUBCUTANEOUS at 13:55

## 2023-11-21 RX ADMIN — Medication 1 TABLET(S): at 10:33

## 2023-11-21 RX ADMIN — PIPERACILLIN AND TAZOBACTAM 25 GRAM(S): 4; .5 INJECTION, POWDER, LYOPHILIZED, FOR SOLUTION INTRAVENOUS at 07:03

## 2023-11-21 RX ADMIN — Medication 1000 MILLIGRAM(S): at 18:29

## 2023-11-21 RX ADMIN — HEPARIN SODIUM 5000 UNIT(S): 5000 INJECTION INTRAVENOUS; SUBCUTANEOUS at 21:48

## 2023-11-21 NOTE — DISCHARGE NOTE PROVIDER - NSDCCPCAREPLAN_GEN_ALL_CORE_FT
PRINCIPAL DISCHARGE DIAGNOSIS  Diagnosis: Cellulitis of left leg  Assessment and Plan of Treatment: You were found to have a skin infection called cellulitis. You were treated with intravenous antibiotics during your stay at [facility]. Should start to notice symptoms such as but not limited to: high persisting fevers (>104 F or lasting more than 3 days), severe pain, increased swelling and/or skin color changes after finishing your antibiotics, please return to the emergency department for interval evaluation.  For antibiotics, please take Bactrim twice a day and Keflex four times a day for the next eight days.  Please follow up with the Wound Care Clinic as listed below.  Wound care instructions: Clean wound with wound cleanser or antimicrobial soap and water. Apply Xeroform or adaptic touch to wet areas along medial distal calf. Wrap leg from toes to thigh with Kerlix. Apply compression by wrapping from foot to thigh with ace bandage. Please have bedside RN change daily or PRN for saturation. (Wound care order in place.)     PRINCIPAL DISCHARGE DIAGNOSIS  Diagnosis: Cellulitis of left leg  Assessment and Plan of Treatment: You were found to have a skin infection called cellulitis. You were treated with intravenous antibiotics during your stay at [facility]. Should start to notice symptoms such as but not limited to: high persisting fevers (>104 F or lasting more than 3 days), severe pain, increased swelling and/or skin color changes after finishing your antibiotics, please return to the emergency department for interval evaluation.  For antibiotics, please take Bactrim twice a day and Keflex four times a day for the next eight days.  Please follow up with the Wound Care Clinic as listed below.  Wound care instructions: Clean wound with wound cleanser or antimicrobial soap and water. Apply Xeroform or adaptic touch to wet areas along medial distal calf. Wrap leg from toes to thigh with Kerlix. Apply compression by wrapping from foot to thigh with ace bandage. Please have bedside RN change daily or PRN for saturation.

## 2023-11-21 NOTE — PROGRESS NOTE ADULT - PROBLEM SELECTOR PLAN 8
Pt reports was told his creatinine has been elevated in past however unsure of baseline. Cr 1.25 on admission. Pt endorses poor PO intake over last week. Likely dehydrated. Received 1L NS    - Trend Cr  - Avoid nephrotoxic medication  - Encourage PO hydration and intake Pt reports was told his creatinine has been elevated in past however unsure of baseline. Cr 1.25 on admission. Pt endorses poor PO intake over last week. Likely dehydrated. Received 1L NS  - Trend Cr  - Avoid nephrotoxic medications  - Encourage PO hydration and intake

## 2023-11-21 NOTE — PHYSICAL THERAPY INITIAL EVALUATION ADULT - IMPAIRMENTS FOUND, PT EVAL
aerobic capacity/endurance/ergonomics and body mechanics/fine motor/gait, locomotion, and balance/gross motor/integumentary integrity/joint integrity and mobility/muscle strength/neuromotor development and sensory integration/posture/ROM

## 2023-11-21 NOTE — CONSULT NOTE ADULT - SUBJECTIVE AND OBJECTIVE BOX
Patient is a 68y old  Male who presents with a chief complaint of cellulitis (21 Nov 2023 09:26)      HPI:  CC: left leg swelling, pain and redness  HPI: 67yo male with PMHx chronic lymphadema, prostate ca (s/p proastectomy, radiation and hormone therapy), HTN, hyperaldosteronism (endo Dr. Suad Feldman) presents with ~1 week left lower leg swelling, erythema and tenderness. Pt reports normally wears compression stockings and notes possibly some trauma from removing his left stocking at some point resulting in left lower medial leg wound however also developed large bullae with yellow filled fluid which has since popped. Endorsed fever and chills last week and went to [facility] on saturday and was prescribed keflex which patient took for 2 days and reports some improvement of swelling however came to ED since patient expected more improvement on antibiotics. Currently no fever or chills and believes that the antibiotics given in ED are helping with swelling. Denies headache, dizziness, chest pain, SOB, abd pain, dysuria.    In the ED:    - VS: Tmax: 97.3, HR: 83, BP: 113/57, RR: 18, O2: 99% room air     - Pertinent Labs: Hb 13, Hb 12.2, bicarb 21, BUN 27, Cr 1.25    - Imaging: CT left LE: Subcutaneous soft tissue edema throughout the left lower extremity that is most prominent at the level of the ankle. Possibly related to cellulitis. No encapsulated peripherally enhancing fluid collection. No soft tissue gas to suggest the presence of necrotizing fasciitis.    - Treatment/interventions: vanc 1250mg, zosyn 3.375g, 1L NS (20 Nov 2023 17:30)    PAST MEDICAL & SURGICAL HISTORY:  Lymphedema      H/O hyperaldosteronism      HTN (hypertension)      Prostate cancer      S/P prostatectomy        MEDICATIONS  (STANDING):  heparin   Injectable 5000 Unit(s) SubCutaneous every 8 hours  influenza  Vaccine (HIGH DOSE) 0.7 milliLiter(s) IntraMuscular once  multivitamin  Chewable 1 Tablet(s) Oral daily  piperacillin/tazobactam IVPB.. 3.375 Gram(s) IV Intermittent every 8 hours  spironolactone 100 milliGRAM(s) Oral daily    MEDICATIONS  (PRN):            FAMILY HISTORY:      CBC Full  -  ( 21 Nov 2023 09:30 )  WBC Count : 10.46 K/uL  RBC Count : 3.61 M/uL  Hemoglobin : 11.2 g/dL  Hematocrit : 34.4 %  Platelet Count - Automated : 372 K/uL  Mean Cell Volume : 95.3 fl  Mean Cell Hemoglobin : 31.0 pg  Mean Cell Hemoglobin Concentration : 32.6 gm/dL  Auto Neutrophil # : 7.44 K/uL  Auto Lymphocyte # : 1.95 K/uL  Auto Monocyte # : 0.61 K/uL  Auto Eosinophil # : 0.22 K/uL  Auto Basophil # : 0.09 K/uL  Auto Neutrophil % : 71.2 %  Auto Lymphocyte % : 18.6 %  Auto Monocyte % : 5.8 %  Auto Eosinophil % : 2.1 %  Auto Basophil % : 0.9 %      11-21    137  |  104  |  15  ----------------------------<  141<H>  4.6   |  23  |  1.00    Ca    9.6      21 Nov 2023 09:30  Phos  4.1     11-21  Mg     1.9     11-21        Urinalysis Basic - ( 21 Nov 2023 09:30 )    Color: x / Appearance: x / SG: x / pH: x  Gluc: 141 mg/dL / Ketone: x  / Bili: x / Urobili: x   Blood: x / Protein: x / Nitrite: x   Leuk Esterase: x / RBC: x / WBC x   Sq Epi: x / Non Sq Epi: x / Bacteria: x        Radiology :     < from: US Duplex Venous Lower Ext Ltd, Left (11.20.23 @ 22:12) >  ACC: [id number] EXAM:  US DPLX LWR EXT VEINS LTD LT   ORDERED BY: JEAN WHEELER     PROCEDURE DATE:  11/20/2023          INTERPRETATION:  CLINICAL INFORMATION: Left leg swelling and pain.    COMPARISON: None available.    TECHNIQUE: Duplex sonography of the LEFT LOWER extremity veins with color   and spectral Doppler, with and without compression.    FINDINGS:    Increased number of inguinal nodes and one noted is mildly enlarged, 1.5   x 1.6 cm, preserved fatty hilum, nonspecific, possibly reactive.      The left posterior tibial and peroneal veins are not evaluated due to   overlying open wound/soft tissue edema.  Reminder of deep venous vasculature including the common femoral,   femoral, popliteal veins intramuscular calf veins are free of thrombi.      IMPRESSION:  No thrombosis in sampled deep venous vasculature. Posterior tibial and   peroneal veins are not evaluated due to overlying wound and soft tissue   edema.      < from: CT Lower Extremity w/ IV Cont, Left (11.20.23 @ 13:31) >  ACC: [id number] EXAM:  CT LWR EXT IC LT   ORDERED BY: ALEJANDRO BAILEY DIRECTOR     PROCEDURE DATE:  11/20/2023          INTERPRETATION:  CT OF THE LEFT LOWER EXTREMITY WITH CONTRAST.    CLINICAL INFORMATION: Left lower extremity swelling and blistering.   Evaluate for necrotizing fasciitis.  TECHNIQUE: Axial CT images were obtained of the left lower extremity with   coronal and sagittal reconstructions. 90 cc Omnipaque 350 was   administered intravenously.    FINDINGS:    There is circumferential subcutaneous soft tissue edema throughout the   left lower extremity is most prominent within the left lower leg above   the level of the knee this is most prominent laterally at the level of   the mid shaft of the femur. There is no encapsulated fluid collection   identified to suggest the presence of an abscess. There is no   peripherally enhancing fluid collection.    There is no soft tissue gas to suggest necrotizing fasciitis. And ankle   extending into the dorsum of the midfoot.    There is mild to moderate tricompartmental osteoarthrosis of the left   knee with a small joint effusion. Mild degenerative changes noted of the   left hip. Left ankle is normal in appearance.    There are no CT findings of osteomyelitis. There is no fracture.    IMPRESSION:    Subcutaneous soft tissue edema throughout the left lower extremity that   is most prominent at the level of the ankle. Possibly related to   cellulitis. No encapsulated peripherally enhancing fluid collection. No   soft tissue gas to suggest the presence of necrotizing fasciitis.      < end of copied text >          Review of Systems : per HPI         Vital Signs Last 24 Hrs  T(C): 36.5 (21 Nov 2023 08:38), Max: 36.7 (20 Nov 2023 20:56)  T(F): 97.7 (21 Nov 2023 08:38), Max: 98 (20 Nov 2023 20:56)  HR: 71 (21 Nov 2023 08:38) (71 - 83)  BP: 110/58 (21 Nov 2023 08:38) (110/58 - 121/66)  BP(mean): --  RR: 18 (21 Nov 2023 08:38) (16 - 18)  SpO2: 97% (21 Nov 2023 05:39) (96% - 99%)    Parameters below as of 21 Nov 2023 08:38  Patient On (Oxygen Delivery Method): room air            Physical Exam :   68 y o man lying comfortably in semi Tello's position , awake , alert , no acute complaints      Head : normocephalic , atraumatic    Eyes : PERRLA , EOMI , no nystagmus , sclera anicteric    ENT / FACE : neg nasal discharge , uvula midline , no oropharyngeal erythema / exudate    Neck : supple , negative JVD , negative carotid bruits , no thyromegaly    Chest : CTA bilaterally , neg wheeze / rhonchi / rales / crackles / egophany    Cardiovascular : regular rate and rhythm , neg murmurs / rubs / gallops    Abdomen : soft , non distended , non tender to palpation in all 4 quadrants ,  normal bowel sounds     Extremities : LLE ace wrapped     Neurologic Exam :     Alert and oriented  x 3        Motor Exam:                Right UE:                     no focal weakness ,  > 3+/5 throughout      Left UE:                       no focal weakness ,  > 3+/5 throughout         Right LE:          no focal weakness ,  > 3+/5 throughout          Left LE:          no focal weakness ,  > 3+/5 throughout           Sensation :         intact to light touch x 4 extremities                                 DTR :           biceps/brachioradialis : equal                            patella/ankle : equal          Gait :  not tested          PM&R Impression : admitted forLLE cellulitis        Recommendations / Plan :       1) Physical / Occupational therapy focusing on therapeutic exercises , equipment evaluation , bed mobility/transfer out of bed evaluation , progressive ambulation with assistive devices prn .    2) Current disposition plan recommendation  :    pending functional progress

## 2023-11-21 NOTE — PROGRESS NOTE ADULT - ASSESSMENT
69yo male with PMHx chronic lymphadema, prostate ca (s/p proastectomy, radiation and hormone therapy), HTN, hyperaldosteronism (endo Dr. Suad Feldman) presents with ~1 week left lower leg swelling, erythema and tenderness, admitted for management of cellulitis. 67yo male with PMHx chronic lymphadema, prostate ca (s/p proastectomy, radiation and hormone therapy), HTN, hyperaldosteronism (endo Dr. Suad Feldman) presents with ~1 week left lower leg swelling, erythema and tenderness, admitted for management of cellulitis. 67yo male with PMHx chronic lymphedema prostate ca (s/p prostatectomy radiation and hormone therapy), HTN, hyperaldosteronism (endo Dr. Suad Feldman) presents with ~1 week left lower leg swelling, erythema and tenderness, admitted for management of cellulitis.

## 2023-11-21 NOTE — PROGRESS NOTE ADULT - PROBLEM SELECTOR PLAN 4
History chronic lymphedema and wears compression stockings daily however has not been able to fit left compression stockings due to swelling    - Vascular consult given severe left lower leg swelling with history of lymphedema  - bilateral leg elevation and continue right leg compression stockings and ACE bandage of left  - DVT left lower leg History chronic lymphedema and wears compression stockings daily however has not been able to fit left compression stockings due to swelling. US LLE shows no DVTs.  - Vascular consult given severe left lower leg swelling with history of lymphedema  - bilateral leg elevation and continue right leg compression stockings and ACE bandage of left

## 2023-11-21 NOTE — DISCHARGE NOTE PROVIDER - CARE PROVIDER_API CALL
Jesus Rubio  Surgery  [address] [place] [address] [address] [place] [address]  Phone: [phone]  Fax: [phone]  Follow Up Time: 2 weeks

## 2023-11-21 NOTE — DISCHARGE NOTE PROVIDER - ATTENDING DISCHARGE PHYSICAL EXAMINATION:
68M with PMH Of chronic lymphedema, prostate cancer, hyperaldosteronism and HTN presenting with worsening erythema, pain and swelling of left lower extremity, concerning for cellulitis and admitted for further workup.     Physical exam  General: no acute distress, sitting up in bed  HEENT: normocephalic, atraumatic, MMM  Cards: RRR, normal S1S2, no mrg  Pulm: CTAB, no wheeze, crackles, rales or rhonchi  Ab: soft, nontender, nondistended, normoactive bowel sounds  Ext: LLE wrapped, RLE warm, no lesions  Neuro: speech is fluent, follows commands, no facial asymmetry, no acute deficits noted    #Cellulitis  - appreciate input from vascular  - no abscess on CT and on DVT on dopplers  - transition to keflex and bactrim to complete treatment course as above  - planned for discharge home today

## 2023-11-21 NOTE — PROGRESS NOTE ADULT - PROBLEM SELECTOR PLAN 3
1/4 SIRS (leukocytosis) likely in setting of cellulitis. Reportedly had fever at home however no fever in ED    - f/u blood cultures  - abx as above 1/4 SIRS (leukocytosis) likely in setting of cellulitis. Reportedly had fever at home however no fever in ED  - f/u blood cultures; NGTD  - abx as above

## 2023-11-21 NOTE — DISCHARGE NOTE PROVIDER - NSDCFUADDAPPT_GEN_ALL_CORE_FT
For wound care clinics, our [facility] Vascular Team typically refers to:   Dr. Jesus Rubio at [facility] ([address]; [phone]) or  Dr. Basil Luong at [place] ([address]; [phone]).     You have previously [place] [facility] [place] and you are welcome to reach out to the lymphedema therapy clinic to see if there is a wound care clinic within the [facility] system.

## 2023-11-21 NOTE — DISCHARGE NOTE PROVIDER - NSDCHHNEEDSERVICE_GEN_ALL_CORE
Teaching and training/Other, specify... Teaching and training/Wound care and assessment/Other, specify...

## 2023-11-21 NOTE — PROGRESS NOTE ADULT - PROBLEM SELECTOR PLAN 1
~1 week duration of left lower leg swelling, erythema with fever and chills, [facility]flex from [facility] for past 2 days presenting with persistent swelling, erythema, and pain. On exam left leg with 2-3+ pitting edema with significant erythema and warmth with left lower medial leg multiple healing wound with small bullae with yellow fluid (previous very large bullae with yellow fluid that has since popped). No fever or chills at this time and meeting 1/4 SIRS criteria (leukocytosis). CT negative for gas. Given vanc and zosyn in ED    - continue vanc 1250mg q12h and zosyn 3.375g q8h  - Encourage PO hydration  - follow up blood culture  - duplex left lower leg  - wound dressing care per vascular  - f/u ESR/CRP ~1 week duration of left lower leg swelling, erythema with fever and chills, started on keflex from [facility] for past 2 days presenting with persistent swelling, erythema, and pain. On exam left leg with 2-3+ pitting edema with significant erythema and warmth with left lower medial leg multiple healing wound with small bullae with yellow fluid (previous very large bullae with yellow fluid that has since popped). No fever or chills at this time and meeting 1/4 SIRS criteria (leukocytosis). CT negative for gas. Given vanc and zosyn in ED. Duplex of LLE no DVT. Elevated ESR 97, elevated .3.  - continue vanc 1250mg q12h and zosyn 3.375g q8h; d/c on oral Bactrim DS and Keflex  - encourage PO hydration  - follow up blood culture  - wound dressing care per vascular  - f/u ESR/CRP

## 2023-11-21 NOTE — PHYSICAL THERAPY INITIAL EVALUATION ADULT - ADDITIONAL COMMENTS
Pt lives in an apartment with his wife, +stairs, +elevator, ambulates with cane recently due to cellulitis and lymphedema

## 2023-11-21 NOTE — PROGRESS NOTE ADULT - PROBLEM SELECTOR PLAN 5
s/p proastectomy, radiation and hormone therapy, follows a urologist at [place]    - outpatient follow up s/p prostatectomy, radiation and hormone therapy, follows a urologist at [place]  - outpatient follow up

## 2023-11-21 NOTE — PROGRESS NOTE ADULT - SUBJECTIVE AND OBJECTIVE BOX
***Note in progress***    OVERNIGHT EVENTS: NAEO    SUBJECTIVE / INTERVAL HPI: Patient seen and examined at bedside. Patient denying chest pain, SOB, palpitations, cough. Patient denies fever, chills, HA, Dizziness, N/V, abdominal pain, diarrhea, constipation, hematochezia/melena, dysuria, hematuria, new onset weakness/numbness, LE pain and/or swelling.    Remaining ROS negative       PHYSICAL EXAM:    General:NAD.   HEENT: NC/AT; PERRL, anicteric sclera; MMM  Neck: supple  Cardiovascular: +S1/S2, RRR  Respiratory: CTA B/L; no W/R/R  Gastrointestinal: soft, NT/ND; +BSx4  Extremities: WWP; no edema, clubbing or cyanosis  Vascular: 2+ radial, DP/PT pulses B/L  Neurological: AAOx3; no focal deficits  Psychiatric: pleasant mood and affect  Dermatologic: no appreciable wounds or damage to the skin    VITAL SIGNS:  Vital Signs Last 24 Hrs  T(C): 36.5 (21 Nov 2023 08:38), Max: 36.7 (20 Nov 2023 20:56)  T(F): 97.7 (21 Nov 2023 08:38), Max: 98 (20 Nov 2023 20:56)  HR: 71 (21 Nov 2023 08:38) (69 - 83)  BP: 110/58 (21 Nov 2023 08:38) (110/58 - 121/69)  BP(mean): --  RR: 18 (21 Nov 2023 08:38) (16 - 18)  SpO2: 97% (21 Nov 2023 05:39) (96% - 99%)    Parameters below as of 21 Nov 2023 08:38  Patient On (Oxygen Delivery Method): room air          MEDICATIONS:  MEDICATIONS  (STANDING):  heparin   Injectable 5000 Unit(s) SubCutaneous every 8 hours  influenza  Vaccine (HIGH DOSE) 0.7 milliLiter(s) IntraMuscular once  multivitamin  Chewable 1 Tablet(s) Oral daily  piperacillin/tazobactam IVPB.. 3.375 Gram(s) IV Intermittent every 8 hours  spironolactone 100 milliGRAM(s) Oral daily  vancomycin  IVPB 1500 milliGRAM(s) IV Intermittent every 12 hours    MEDICATIONS  (PRN):      ALLERGIES:  Allergies    No Known Allergies    Intolerances        LABS:                        12.2   13.00 )-----------( 406      ( 20 Nov 2023 10:07 )             36.8     11-20    137  |  106  |  27<H>  ----------------------------<  140<H>  4.9   |  21<L>  |  1.25    Ca    9.9      20 Nov 2023 10:07        Urinalysis Basic - ( 20 Nov 2023 10:07 )    Color: x / Appearance: x / SG: x / pH: x  Gluc: 140 mg/dL / Ketone: x  / Bili: x / Urobili: x   Blood: x / Protein: x / Nitrite: x   Leuk Esterase: x / RBC: x / WBC x   Sq Epi: x / Non Sq Epi: x / Bacteria: x      CAPILLARY BLOOD GLUCOSE          RADIOLOGY & ADDITIONAL TESTS: Reviewed. OVERNIGHT EVENTS: NAEO    SUBJECTIVE / INTERVAL HPI: Patient seen and examined at bedside. Patient denying chest pain, SOB, palpitations, cough. Patient denies fever, chills, HA, Dizziness, N/V, abdominal pain, diarrhea, constipation, hematochezia/melena, dysuria, hematuria, new onset weakness/numbness, LE pain and/or swelling. Remaining ROS negative     PHYSICAL EXAM:  General: NAD.   HEENT: NC/AT; PERRL, anicteric sclera; MMM  Neck: supple  Cardiovascular: +S1/S2, RRR  Respiratory: CTA B/L; no W/R/R  Gastrointestinal: soft, NT/ND; +BSx4  Extremities: WWP; LLE wrapped with ACE bandage with improving purulent cellulitis and yellow pus filled blister is smaller in size  Vascular: 2+ radial, DP/PT pulses B/L  Neurological: AAOx3; no focal deficits  Psychiatric: pleasant mood and affect  Dermatologic: no appreciable wounds or damage to the skin    VITAL SIGNS:  Vital Signs Last 24 Hrs  T(C): 36.5 (21 Nov 2023 08:38), Max: 36.7 (20 Nov 2023 20:56)  T(F): 97.7 (21 Nov 2023 08:38), Max: 98 (20 Nov 2023 20:56)  HR: 71 (21 Nov 2023 08:38) (69 - 83)  BP: 110/58 (21 Nov 2023 08:38) (110/58 - 121/69)  BP(mean): --  RR: 18 (21 Nov 2023 08:38) (16 - 18)  SpO2: 97% (21 Nov 2023 05:39) (96% - 99%)    Parameters below as of 21 Nov 2023 08:38  Patient On (Oxygen Delivery Method): room air    MEDICATIONS:  MEDICATIONS  (STANDING):  heparin   Injectable 5000 Unit(s) SubCutaneous every 8 hours  influenza  Vaccine (HIGH DOSE) 0.7 milliLiter(s) IntraMuscular once  multivitamin  Chewable 1 Tablet(s) Oral daily  piperacillin/tazobactam IVPB.. 3.375 Gram(s) IV Intermittent every 8 hours  spironolactone 100 milliGRAM(s) Oral daily  vancomycin  IVPB 1500 milliGRAM(s) IV Intermittent every 12 hours    MEDICATIONS  (PRN):  ALLERGIES:  Allergies  No Known Allergies  Intolerances               11.2   10.46 )-----------( 372      ( 21 Nov 2023 09:30 )             34.4       11-21    137  |  104  |  15  ----------------------------<  141<H>  4.6   |  23  |  1.00    Ca    9.6      21 Nov 2023 09:30  Phos  4.1     11-21  Mg     1.9     11-21    Urinalysis Basic - ( 21 Nov 2023 09:30 )  Color: x / Appearance: x / SG: x / pH: x  Gluc: 141 mg/dL / Ketone: x  / Bili: x / Urobili: x   Blood: x / Protein: x / Nitrite: x   Leuk Esterase: x / RBC: x / WBC x   Sq Epi: x / Non Sq Epi: x / Bacteria: x  CAPILLARY BLOOD GLUCOSE  Urinalysis Basic - ( 20 Nov 2023 10:07 )  Color: x / Appearance: x / SG: x / pH: x  Gluc: 140 mg/dL / Ketone: x  / Bili: x / Urobili: x   Blood: x / Protein: x / Nitrite: x   Leuk Esterase: x / RBC: x / WBC x   Sq Epi: x / Non Sq Epi: x / Bacteria: x  CAPILLARY BLOOD GLUCOSE  RADIOLOGY & ADDITIONAL TESTS: Reviewed.    US Duplex LLE 11/20/23:  No thrombosis in sampled deep venous vasculature. Posterior tibial and   peroneal veins are not evaluated due to overlying wound and soft tissue   edema.    CT LLE w/ IV Contrast 11/20/23:  Subcutaneous soft tissue edema throughout the left lower extremity that   is most prominent at the level of the ankle. Possibly related to   cellulitis. No encapsulated peripherally enhancing fluid collection. No   soft tissue gas to suggest the presence of necrotizing fasciitis.

## 2023-11-21 NOTE — PROGRESS NOTE ADULT - PROBLEM SELECTOR PLAN 2
~1 week of significant left leg swelling, 2-3+ pitting edema likely 2/2 cellulitis in addition to history of lymphedema however pt notes has been less physically active with less ambulation and has history of malignancy    - Duplex left lower leg to r/o DVT  - vascular consult  - leg elevation and continue compression stockings for right leg and ACE bandage of left ~1 week of significant left leg swelling, 2-3+ pitting edema likely 2/2 cellulitis in addition to history of lymphedema however pt notes has been less physically active with less ambulation and has history of malignancy. Duplex left lower leg showed no DVT.  - vascular consult recs above  - leg elevation and continue compression stockings for right leg and ACE bandage of left

## 2023-11-21 NOTE — DISCHARGE NOTE PROVIDER - NSDCACTIVITY_GEN_ALL_CORE
No restrictions Area H Indication Text: Tumors in this location are included in Area H (eyelids, eyebrows, nose, lips, chin, ear, pre-auricular, post-auricular, temple, genitalia, hands, feet, ankles and areola).  Tissue conservation is critical in these anatomic locations.

## 2023-11-21 NOTE — PROGRESS NOTE ADULT - PROBLEM SELECTOR PLAN 7
On home nifedipine 120mg daily and metoprolol succ 25mg daily. Normotensive at this time    - Restart home BP as needed, normotensive at this time    #Alcohol use disorder  Drinks 1-2L wine regularly however in past week has not had any alcohol. No withdrawal symptoms and never had withdrawals in past. Low suspicion for withdrawal as last drank 1 week ago  - Education and counseling provided on alcohol reduction On home nifedipine 120mg daily and metoprolol succ 25mg daily. Normotensive at this time  - Restart home BP as needed, normotensive at this time    #Alcohol use disorder  Drinks 1-2L wine regularly however in past week has not had any alcohol. No withdrawal symptoms and never had withdrawals in past. Low suspicion for withdrawal as last drank 1 week ago  - Education and counseling provided on alcohol reduction

## 2023-11-21 NOTE — DISCHARGE NOTE PROVIDER - NSDCMRMEDTOKEN_GEN_ALL_CORE_FT
aspirin 81 mg oral tablet, chewable: 1 tab(s) chewed once a day  Bactrim  mg-160 mg oral tablet: 1 tab(s) orally 2 times a day  cephalexin 500 mg oral capsule: 1 cap(s) orally 4 times a day  metoprolol succinate 25 mg oral tablet, extended release: 1 tab(s) orally every 24 hours  Multiple Vitamins oral tablet, chewable: orally once a day  NIFEdipine 60 mg oral tablet, extended release: 2 tab(s) orally every 24 hours  spironolactone 100 mg oral tablet: 1 tab(s) orally once a day   aspirin 81 mg oral tablet, chewable: 1 tab(s) chewed once a day  Bactrim  mg-160 mg oral tablet: 1 tab(s) orally 2 times a day  cephalexin 500 mg oral capsule: 1 cap(s) orally 4 times a day  metoprolol succinate 25 mg oral tablet, extended release: 1 tab(s) orally every 24 hours  Multiple Vitamins oral tablet, chewable: orally once a day  NIFEdipine 60 mg oral tablet, extended release: 2 tab(s) orally every 24 hours  Rolling Walker: Rolling walker; ICD Code: R54  spironolactone 100 mg oral tablet: 1 tab(s) orally once a day

## 2023-11-21 NOTE — DISCHARGE NOTE PROVIDER - HOSPITAL COURSE
67yo male with PMHx chronic lymphedema prostate ca (s/p prostatectomy radiation and hormone therapy), HTN, hyperaldosteronism (endo Dr. Suad Feldman) presents with ~1 week left lower leg swelling, erythema and tenderness, admitted for management of cellulitis.    #Cellulitis #LLE Edema #SIRS #Lymphedema  1 week duration of left lower leg swelling, erythema with fever and chills, [facility] for past 2 days presenting with persistent swelling, erythema, and pain. 1/4 SIRS (leukocytosis) likely in setting of cellulitis, with improving leukocytosis on abx. Reportedly had fever at home however no fever in ED. On exam left leg with 2-3+ pitting edema with significant erythema and warmth with left lower medial leg multiple healing wound with small bullae with yellow fluid (previous very large bullae with yellow fluid that has since popped). CT negative for gas. History chronic lymphedema and wears compression stockings daily however has not been able to fit left compression stockings due to swelling. US LLE shows no DVTs. Given vanc and zosyn in ED. Elevated ESR 97, elevated .3. On 11/20, started on vanc 1250mg q12h and zosyn 3.375g q8h, With NGTD on BCx, d/c on oral Bactrim DS BID, Keflex 4xday for 8 days. Wound care dressing per vascular team. Continue leg elevation and continue compression stockings for right leg and ACE bandage of left.     #Prostate cancer  S/p prostatectomy, radiation and hormone therapy, follows a urologist at [place]  - outpatient follow up.    # H/O hyperaldosteronism.   Continued home spironolactone 100mg daily. Continue o/p f/u w/ endo Dr. Suad Feldman    #HTN   On home nifedipine 120mg daily and metoprolol succ 25mg daily. Normotensive during admission    #Alcohol use disorder  Drink 1-2L wine regularly however in past week has not had any alcohol. No withdrawal symptoms and never had withdrawals in past. Low suspicion for withdrawal as last drank 1 week ago. Education and counseling provided on alcohol reduction.    Patient was discharged to: home. New medications: Bactrim, Keflex. No changes to old medications. No medications that were stopped. Items to follow up as outpatient: Wound care, Vascular    Physical exam at the time of discharge:  General: NAD.   HEENT: NC/AT; PERRL, anicteric sclera; MMM  Neck: supple  Cardiovascular: +S1/S2, RRR  Respiratory: CTA B/L; no W/R/R  Gastrointestinal: soft, NT/ND; +BSx4  Extremities: WWP; LLE wrapped with ACE bandage with improving purulent cellulitis and yellow pus filled blister is smaller in size  Vascular: 2+ radial, DP/PT pulses B/L  Neurological: AAOx3; no focal deficits  Psychiatric: pleasant mood and affect  Dermatologic: no appreciable wounds or damage to the skin    US Duplex LLE 11/20/23:  No thrombosis in sampled deep venous vasculature. Posterior tibial and   peroneal veins are not evaluated due to overlying wound and soft tissue   edema.    CT LLE w/ IV Contrast 11/20/23:  Subcutaneous soft tissue edema throughout the left lower extremity that   is most prominent at the level of the ankle. Possibly related to   cellulitis. No encapsulated peripherally enhancing fluid collection. No   soft tissue gas to suggest the presence of necrotizing fasciitis.     69yo male with PMHx chronic lymphedema prostate ca (s/p prostatectomy radiation and hormone therapy), HTN, hyperaldosteronism (endo Dr. Suad Feldman) presents with ~1 week left lower leg swelling, erythema and tenderness, admitted for management of cellulitis.    #Cellulitis #LLE Edema #SIRS #Lymphedema  1 week duration of left lower leg swelling, erythema with fever and chills, started on keflex from urgent care for past 2 days presenting with persistent swelling, erythema, and pain. 1/4 SIRS (leukocytosis) likely in setting of cellulitis, with improving leukocytosis on abx. Reportedly had fever at home however no fever in ED. On exam left leg with 2-3+ pitting edema with significant erythema and warmth with left lower medial leg multiple healing wound with small bullae with yellow fluid (previous very large bullae with yellow fluid that has since popped). CT negative for gas. History chronic lymphedema and wears compression stockings daily however has not been able to fit left compression stockings due to swelling. US LLE shows no DVTs. Given vanc and zosyn in ED. Elevated ESR 97, elevated .3. On 11/20, started on vanc 1250mg q12h and zosyn 3.375g q8h, With NGTD on BCx, d/c on oral Bactrim DS BID, Keflex 4xday for 8 days. Wound care dressing per vascular team. Continue leg elevation and continue compression stockings for right leg and ACE bandage of left.     #Prostate cancer  S/p prostatectomy, radiation and hormone therapy, follows a urologist at [place]  - outpatient follow up.    #H/O hyperaldosteronism.   Continued home spironolactone 100mg daily. Continue o/p f/u w/ endo Dr. Suad Feldman    #HTN   On home nifedipine 120mg daily and metoprolol succinate 25mg daily. Normotensive during admission    #Alcohol use disorder  Drink 1-2L wine regularly however in past week has not had any alcohol. No withdrawal symptoms and never had withdrawals in past. Low suspicion for withdrawal as last drank 1 week ago. Education and counseling provided on alcohol reduction.    Patient was discharged to: home. New medications: Bactrim, Keflex. No changes to old medications. No medications that were stopped. Items to follow up as outpatient: Wound care, Vascular    Physical exam at the time of discharge:  General: NAD.   HEENT: NC/AT; PERRL, anicteric sclera; MMM  Neck: supple  Cardiovascular: +S1/S2, RRR  Respiratory: CTA B/L; no W/R/R  Gastrointestinal: soft, NT/ND; +BSx4  Extremities: WWP; LLE wrapped with ACE bandage with improving purulent cellulitis and yellow pus filled blister is smaller in size  Vascular: 2+ radial, DP/PT pulses B/L  Neurological: AAOx3; no focal deficits  Psychiatric: pleasant mood and affect  Dermatologic: no appreciable wounds or damage to the skin    US Duplex LLE 11/20/23:  No thrombosis in sampled deep venous vasculature. Posterior tibial and   peroneal veins are not evaluated due to overlying wound and soft tissue   edema.    CT LLE w/ IV Contrast 11/20/23:  Subcutaneous soft tissue edema throughout the left lower extremity that   is most prominent at the level of the ankle. Possibly related to   cellulitis. No encapsulated peripherally enhancing fluid collection. No   soft tissue gas to suggest the presence of necrotizing fasciitis. 69yo male with PMHx chronic lymphedema prostate ca (s/p prostatectomy radiation and hormone therapy), HTN, hyperaldosteronism (endo Dr. Suad Feldman) presents with ~1 week left lower leg swelling, erythema and tenderness, admitted for management of cellulitis.    #Cellulitis #LLE Edema #SIRS #Lymphedema  1 week duration of left lower leg swelling, erythema with fever and chills, started on keflex from urgent care for past 2 days presenting with persistent swelling, erythema, and pain. 1/4 SIRS (leukocytosis) likely in setting of cellulitis, with improving leukocytosis on abx. Reportedly had fever at home however no fever in ED. On exam left leg with 2-3+ pitting edema with significant erythema and warmth with left lower medial leg multiple healing wound with small bullae with yellow fluid (previous very large bullae with yellow fluid that has since popped). CT negative for gas. History chronic lymphedema and wears compression stockings daily however has not been able to fit left compression stockings due to swelling. US LLE shows no DVTs. Given vanc and zosyn in ED. Elevated ESR 97, elevated .3. On 11/20, started on vanc 1250mg q12h and zosyn 3.375g q8h, With NGTD on BCx, d/c on oral Bactrim DS BID, Keflex 4xday for 8 days. Wound care dressing per vascular team. Continue leg elevation and continue compression stockings for right leg and ACE bandage of left.     #Prostate cancer  S/p prostatectomy, radiation and hormone therapy, follows a urologist at [place]. Follow up outpatient.    #H/O hyperaldosteronism.   Continued home spironolactone 100mg daily. Continue o/p f/u w/ endo Dr. Suad Feldman    #HTN   On home nifedipine 120mg daily and metoprolol succinate 25mg daily. Normotensive during admission    #Alcohol use disorder  Drink 1-2L wine regularly however in past week has not had any alcohol. No withdrawal symptoms and never had withdrawals in past. Low suspicion for withdrawal as last drank 1 week ago. Education and counseling provided on alcohol reduction.    Patient was discharged to: home. New medications: Bactrim, Keflex. No changes to old medications. No medications that were stopped. Items to follow up as outpatient: Wound care/ Vascular    Physical exam at the time of discharge:  General: NAD, well groomed, well appearing, good hygiene   HEENT: NC/AT; PERRL, anicteric sclera; MMM  Neck: supple  Cardiovascular: +S1/S2, RRR  Respiratory: CTA B/L; no W/R/R  Gastrointestinal: soft, NT/ND; +BSx4  Extremities: LLE w/ pitting edema improved, persisting in calf. Erythema improved but persisting in calf. Skin of calf and distal thigh wrinkling w/ improvement in edema. Territorial denuded tissue around medial malleolus with scattered bullae which have dried and solidified to a gel. 4cm x 2cm clean ulcer of posterior calf w/o purulence. Ulcer clean. No fluctuance or induration.   Vascular: 2+ radial, DP/PT pulses B/L  Neurological: AAOx3; no focal deficits  Psychiatric: pleasant mood and affect  Dermatologic: no appreciable wounds or damage to the skin    US Duplex LLE 11/20/23:  No thrombosis in sampled deep venous vasculature. Posterior tibial and   peroneal veins are not evaluated due to overlying wound and soft tissue   edema.    CT LLE w/ IV Contrast 11/20/23:  Subcutaneous soft tissue edema throughout the left lower extremity that   is most prominent at the level of the ankle. Possibly related to   cellulitis. No encapsulated peripherally enhancing fluid collection. No   soft tissue gas to suggest the presence of necrotizing fasciitis.

## 2023-11-21 NOTE — PROGRESS NOTE ADULT - PROBLEM SELECTOR PLAN 9
Fluids: s/p 1L NS  Electrolytes: Replete to keep K>4 and Mg>2  Nutrition: DASH  DVT ppx: Heparin  Dispo: F Fluids: s/p 1L NS  Electrolytes: Replete to keep K>4 Phos >3 Mg>2  Nutrition: DASH  DVT ppx: Heparin  Dispo: F

## 2023-11-21 NOTE — PROGRESS NOTE ADULT - SUBJECTIVE AND OBJECTIVE BOX
DAILY PROGRESS NOTE    S: Patient seen on AM rounds. He states he is having some pain on the plantar aspect of his foot. Not having fevers or chills. DVT ultrasound negative.     O:     T(C): 36.5 (11-21-23 @ 08:38), Max: 36.7 (11-20-23 @ 20:56)  HR: 71 (11-21-23 @ 08:38) (69 - 83)  BP: 110/58 (11-21-23 @ 08:38) (110/58 - 121/69)  RR: 18 (11-21-23 @ 08:38) (16 - 18)  SpO2: 97% (11-21-23 @ 05:39) (96% - 99%)    Physical Exam:     General: Well appearing, nonseptic  CV: HDS, WWP  Pulm: On room air, no effort  Abd: S/nt/nd  Extremity: LLE w/ pitting edema improved, persisting in calf. Erythema improved but persisting in calf. Skin of calf and distal thigh wrinkling w/ improvement in edema. Territorial denuded tissue around medial malleolus with scattered bullae which have dried and solidified to a gel. 4cm x 2cm clean ulcer of posterior calf w/o purulence. Ulcer clean. No fluctuance or induration.   Vascular:                 R            L   Fem      2+          2+      Pop      2+           2+         DP        2+           2+           PT         2+          2+                             Labs:              AM labs pending. WBC yesterday 13.     Other studies: LLE duplex negative. Enlarged nodes.     A/P: 68M w/ PMHx chronic lymphedema who presents with suspected uncomplicated cellulitis of the LLE superimposed on lymphedema. CT scan w/o gas or fluid collections. Erythema and edema improving on exam today w/ IV antibiotics, compression and elevation. AM labs pending.     - Patient will need wound care referral on discharge for follow up of left calf wound, possible unna boot placement. For wound care clinics, we typically refer to Dr. Jesus Rubio at [facility] ([address]; [phone]) or Dr. Basil Luong at [place] ([address], [address]; [phone]). Patient has previously been seen at [facility] for lymphedema therapy and he stated he would reach out to his lymphedema therapy clinic to see if there is a wound care clinic within the [facility] system.     - Wound care instructions: Clean wound with wound cleanser or antimicrobial soap and water. Apply Xeroform or adaptic touch to wet areas along medial distal calf. Wrap leg from toes to thigh with Kerlix. Apply compression by wrapping from foot to thigh with ace bandage. Please have bedside RN change daily or PRN for saturation. (Wound care order in place.)    - Please elevate leg at all times when in bed.     - Vascular surgery (Team 3) will sign off. Please call if we can be of assistance. The phone number is [phone] and we can be reached there 24/7.     Discussed w/ Chief. Staffed w/ Dr. Nash

## 2023-11-21 NOTE — PHYSICAL THERAPY INITIAL EVALUATION ADULT - CRITERIA FOR SKILLED THERAPEUTIC INTERVENTIONS
Per FAX there were no changes to med, dosage, sig or quantity.   The medication(s) set up as pending and waiting for your approval.  Preferred Pharmacy has been set up and verified     impairments found/functional limitations in following categories/risk reduction/prevention/rehab potential/therapy frequency/predicted duration of therapy intervention/anticipated equipment needs at discharge/anticipated discharge recommendation

## 2023-11-21 NOTE — PROGRESS NOTE ADULT - PROBLEM SELECTOR PLAN 6
On home spironolactone 100mg daily. Follows tracey Feldman    - continue spironolactone 100mg daily On home spironolactone 100mg daily. Follows tracey Feldman  - continue spironolactone 100mg daily

## 2023-11-21 NOTE — CONSULT NOTE ADULT - ASSESSMENT
{\rtf1\nuybww16888\ansi\yquklrw3752\ftnbj\uc1\deff0  {\fonttbl{\f0 \fnil Segoe UI;}{\f1 \fnil \fcharset0 Segoe UI;}{\f2 \fnil Times New Aristides;}}  {\colortbl ;\uqs748\dwhjq057\spnq495 ;\red0\green0\blue0 ;\red0\green0\vnlh490 ;\red0\green0\blue0 ;}  {\stylesheet{\f0\fs20 Normal;}{\cs1 Default Paragraph Font;}{\cs2\f0\fs16 Line Number;}{\cs3\f2\fs24\ul\cf3 Hyperlink;}}  {\*\revtbl{Unknown;}}  \zikixo12584\efiqbe93711\afomm7181\buade9658\gdexl3135\sextw7690\xmuwiro921\eafpzxe724\nogrowautofit\rnjhxw868\formshade\nofeaturethrottle1\dntblnsbdb\fet4\aendnotes\aftnnrlc\pgbrdrhead\pgbrdrfoot  \sectd\ygnmsk75121\zgotzd17921\guttersxn0\mytwncjk0380\rtwnnioi5746\arpfsvvm2762\hychdykf0079\ohxfuvs491\garhjgl675\sbkpage\pgncont\pgndec  \plain\plain\f0\fs24\ql\plain\f0\fs24\plain\f0\fs20\wphm4668\hich\f0\dbch\f0\loch\f0\fs20\par  I M\par  \par  68 y o male with PMHx chronic lymphadema, prostate ca (s/p proastectomy, radiation and hormone therapy), HTN, hyperaldosteronism (endo Dr. Suad Feldman) presents with ~1 week left lower leg swelling, erythema and tenderness, admitted for management of   cellulitis.\par  \par  \plain\f1\fs20\nerk4113\hich\f1\dbch\f1\loch\f1\cf2\fs20\ul{\field{\*\fldinst HYPERLINK [id number] }{\fldrslt Problem/Plan - 1:}}\plain\f0\fs20\thki9914\hich\f0\dbch\f0\loch\f0\fs20\ql\par  \'b7  {\*\bkmkstart [id number]}{\*\bkmkend dq16942515355}Problem: {\*\bkmkstart ma08686092794}{\*\bkmkend vv48000458536}Cellulitis. \par  \'b7  {\*\bkmkstart cy29695061655}{\*\bkmkend kp74954856648}Plan: {\*\bkmkstart fj41289505205}{\*\bkmkend oy19573909807}~1 week duration of left lower leg swelling, erythema with fever and chills, [facility] for past 2 days presenting   with persistent swelling, erythema, and pain. On exam left leg with 2-3+ pitting edema with significant erythema and warmth with left lower medial leg multiple healing wound with small bullae with yellow fluid (previous very large bullae with yellow fluid   that has since popped). No fever or chills at this time and meeting 1/4 SIRS criteria (leukocytosis). CT negative for gas. Given vanc and zosyn in ED\par  \par  - continue vanc 1250mg q12h and zosyn 3.375g q8h\par  - Encourage PO hydration\par  - follow up blood culture\par  - duplex left lower leg\par  - wound dressing care per vascular\par  - f/u ESR/CRP.\par  \par  \plain\f1\fs20\amdq2575\hich\f1\dbch\f1\loch\f1\cf2\fs20\ul{\field{\*\fldinst HYPERLINK [id number] }{\fldrslt Problem/Plan - 2:}}\plain\f0\fs20\rfbh4854\hich\f0\dbch\f0\loch\f0\fs20\ql\par  \'b7  {\*\bkmkstart pa05290962341}{\*\bkmkend bd77091406606}Problem: {\*\bkmkstart pl16602184772}{\*\bkmkend sw10891437707}Left leg swelling. \par  \'b7  {\*\bkmkstart gr00850456964}{\*\bkmkend oe71616974294}Plan: {\*\bkmkstart eg67498043006}{\*\bkmkend ic74754527565}~1 week of significant left leg swelling, 2-3+ pitting edema likely 2/2 cellulitis in addition to history of lymphedema however pt   notes has been less physically active with less ambulation and has history of malignancy\par  \par  - Duplex left lower leg to r/o DVT\par  - vascular consult\par  - leg elevation and continue compression stockings for right leg and ACE bandage of left.\par  \par  \plain\f1\fs20\pzbn0023\hich\f1\dbch\f1\loch\f1\cf2\fs20\ul{\field{\*\fldinst HYPERLINK [id number] }{\fldrslt Problem/Plan - 3:}}\plain\f0\fs20\fkrv4758\hich\f0\dbch\f0\loch\f0\fs20\ql\par  \'b7  {\*\bkmkstart ri36165375765}{\*\bkmkend cc39066490642}Problem: {\*\bkmkstart ab21786885174}{\*\bkmkend xr93015018212}Systemic inflammatory response syndrome (SIRS). \par  \'b7  {\*\bkmkstart ex86835025394}{\*\bkmkend pd21924720857}Plan: {\*\bkmkstart jy44753925854}{\*\bkmkend qg91246980374}1/4 SIRS (leukocytosis) likely in setting of cellulitis. Reportedly had fever at home however no fever in ED\par  \par  - f/u blood cultures\par  - abx as above.\par  \par  \plain\f1\fs20\jcsx4827\hich\f1\dbch\f1\loch\f1\cf2\fs20\ul{\field{\*\fldinst HYPERLINK [id number] }{\fldrslt Problem/Plan - 4:}}\plain\f0\fs20\ugik1575\hich\f0\dbch\f0\loch\f0\fs20\ql\par  \'b7  {\*\bkmkstart ya13560453753}{\*\bkmkend xe52415896890}Problem: {\*\bkmkstart hd26686152277}{\*\bkmkend ua14342381801}Lymphedema. \par  \'b7  {\*\bkmkstart or97758599715}{\*\bkmkend dl11109259021}Plan: {\*\bkmkstart xg30573927716}{\*\bkmkend cf99306048966}History chronic lymphedema and wears compression stockings daily however has not been able to fit left compression stockings due to   swelling\par  \par  - Vascular consult given severe left lower leg swelling with history of lymphedema\par  - bilateral leg elevation and continue right leg compression stockings and ACE bandage of left\par  - DVT left lower leg.\par  \par  \plain\f1\fs20\fali2344\hich\f1\dbch\f1\loch\f1\cf2\fs20\ul{\field{\*\fldinst HYPERLINK [id number] }{\fldrslt Problem/Plan - 5:}}\plain\f0\fs20\rxpb0138\hich\f0\dbch\f0\loch\f0\fs20\ql\par  \'b7  {\*\bkmkstart jx27805868650}{\*\bkmkend rj85415210634}Problem: {\*\bkmkstart vg15341878550}{\*\bkmkend po85276003293}Prostate cancer. \par  \'b7  {\*\bkmkstart ec83127338069}{\*\bkmkend jb34235230468}Plan: {\*\bkmkstart bq03493038445}{\*\bkmkend ix37599931727}s/p proastectomy, radiation and hormone therapy, follows a urologist at [place]\par  \par  - outpatient follow up.\par  \par  \plain\f1\fs20\hafx5877\hich\f1\dbch\f1\loch\f1\cf2\fs20\ul{\field{\*\fldinst HYPERLINK [id number] }{\fldrslt Problem/Plan - 6:}}\plain\f0\fs20\itdt6294\hich\f0\dbch\f0\loch\f0\fs20\ql\par  \'b7  {\*\bkmkstart tt34027488023}{\*\bkmkend fh71641767574}Problem: {\*\bkmkstart ch84767458243}{\*\bkmkend xh22623492867}H/O hyperaldosteronism. \par  \'b7  {\*\bkmkstart bf22524832893}{\*\bkmkend oa32906884882}Plan: {\*\bkmkstart un42327231084}{\*\bkmkend ja65186461661}On home spironolactone 100mg daily. Follows endo Dr. Suad Feldman\par  \par  - continue spironolactone 100mg daily.\plain\f1\fs20\ovau8741\hich\f1\dbch\f1\loch\f1\cf2\fs20\strike\plain\f0\fs20\ixoa8998\hich\f0\dbch\f0\loch\f0\fs20\par  \par  \plain\f1\fs20\kcha9084\hich\f1\dbch\f1\loch\f1\cf2\fs20\ul{\field{\*\fldinst HYPERLINK [id number] }{\fldrslt Problem/Plan - 7:}}\plain\f0\fs20\kqqo6525\hich\f0\dbch\f0\loch\f0\fs20\ql\par  \'b7  {\*\bkmkstart zj58809284696}{\*\bkmkend kr27049531475}Problem: {\*\bkmkstart pf40496207466}{\*\bkmkend xi30993302494}HTN (hypertension). \par  \'b7  {\*\bkmkstart on18516667473}{\*\bkmkend xi09472043039}Plan: {\*\bkmkstart sv09992258837}{\*\bkmkend bo41957761079}On home nifedipine 120mg daily and metoprolol succ 25mg daily. Normotensive at this time\par  \par  - Restart home BP as needed, normotensive at this time\par  \par  #Alcohol use disorder\par  Drinks 1-2L wine regularly however in past week has not had any alcohol. No withdrawal symptoms and never had withdrawals in past. Low suspicion for withdrawal as last drank 1 week ago\par  - Education and counseling provided on alcohol reduction.\plain\f1\fs20\cyso0424\hich\f1\dbch\f1\loch\f1\cf2\fs20\strike\plain\f0\fs20\rmsw1223\hich\f0\dbch\f0\loch\f0\fs20\par  \par  \plain\f1\fs20\ajpq1503\hich\f1\dbch\f1\loch\f1\cf2\fs20\ul{\field{\*\fldinst HYPERLINK [id number] }{\fldrslt Problem/Plan - 8:}}\plain\f0\fs20\mmwy8457\hich\f0\dbch\f0\loch\f0\fs20\ql\par  \'b7  {\*\bkmkstart xp73599412153}{\*\bkmkend fe51675051762}Problem: {\*\bkmkstart qo65448582127}{\*\bkmkend ep96347496284}Chronic kidney disease (CKD). \par  \'b7  {\*\bkmkstart gc24783926779}{\*\bkmkend za89453224048}Plan: {\*\bkmkstart ae92854934049}{\*\bkmkend lv67102815847}Pt reports was told his creatinine has been elevated in past however unsure of baseline. Cr 1.25 on admission. Pt endorses poor PO   intake over last week. Likely dehydrated. Received 1L NS\par  \par  - Trend Cr\par  - Avoid nephrotoxic medication\par  - Encourage PO hydration and intake.\par  \par  \plain\f1\fs20\lsrm2875\hich\f1\dbch\f1\loch\f1\cf2\fs20\ul{\field{\*\fldinst HYPERLINK [id number] }{\fldrslt Problem/Plan - 9:}}\plain\f0\fs20\dqxm6890\hich\f0\dbch\f0\loch\f0\fs20\ql\par  \'b7  {\*\bkmkstart ic33822188768}{\*\bkmkend xq57171754917}Problem: {\*\bkmkstart yw27166491969}{\*\bkmkend mm66095418696}Prophylactic measure. \par  \'b7  {\*\bkmkstart da50016187538}{\*\bkmkend dg41683898003}Plan: {\*\bkmkstart oi21763600093}{\*\bkmkend db12899012905}Fluids: s/p 1L NS\par  Electrolytes: Replete to keep K>4 and Mg>2\par  Nutrition: DASH\par  DVT ppx: Heparin\par  Dispo: RMF.\par  \par  }

## 2023-11-22 ENCOUNTER — TRANSCRIPTION ENCOUNTER (OUTPATIENT)
Age: 68
End: 2023-11-22

## 2023-11-22 VITALS
SYSTOLIC BLOOD PRESSURE: 124 MMHG | TEMPERATURE: 98 F | RESPIRATION RATE: 18 BRPM | DIASTOLIC BLOOD PRESSURE: 64 MMHG | OXYGEN SATURATION: 98 % | HEART RATE: 67 BPM

## 2023-11-22 LAB
ANION GAP SERPL CALC-SCNC: 8 MMOL/L — SIGNIFICANT CHANGE UP (ref 5–17)
ANION GAP SERPL CALC-SCNC: 8 MMOL/L — SIGNIFICANT CHANGE UP (ref 5–17)
BUN SERPL-MCNC: 11 MG/DL — SIGNIFICANT CHANGE UP (ref 7–23)
BUN SERPL-MCNC: 11 MG/DL — SIGNIFICANT CHANGE UP (ref 7–23)
CALCIUM SERPL-MCNC: 9.4 MG/DL — SIGNIFICANT CHANGE UP (ref 8.4–10.5)
CALCIUM SERPL-MCNC: 9.4 MG/DL — SIGNIFICANT CHANGE UP (ref 8.4–10.5)
CHLORIDE SERPL-SCNC: 103 MMOL/L — SIGNIFICANT CHANGE UP (ref 96–108)
CHLORIDE SERPL-SCNC: 103 MMOL/L — SIGNIFICANT CHANGE UP (ref 96–108)
CO2 SERPL-SCNC: 23 MMOL/L — SIGNIFICANT CHANGE UP (ref 22–31)
CO2 SERPL-SCNC: 23 MMOL/L — SIGNIFICANT CHANGE UP (ref 22–31)
CREAT SERPL-MCNC: 0.91 MG/DL — SIGNIFICANT CHANGE UP (ref 0.5–1.3)
CREAT SERPL-MCNC: 0.91 MG/DL — SIGNIFICANT CHANGE UP (ref 0.5–1.3)
CRP SERPL-MCNC: 62.6 MG/L — HIGH (ref 0–4)
CRP SERPL-MCNC: 62.6 MG/L — HIGH (ref 0–4)
EGFR: 91 ML/MIN/1.73M2 — SIGNIFICANT CHANGE UP
EGFR: 91 ML/MIN/1.73M2 — SIGNIFICANT CHANGE UP
ERYTHROCYTE [SEDIMENTATION RATE] IN BLOOD: 103 MM/HR — HIGH
ERYTHROCYTE [SEDIMENTATION RATE] IN BLOOD: 103 MM/HR — HIGH
GLUCOSE SERPL-MCNC: 117 MG/DL — HIGH (ref 70–99)
GLUCOSE SERPL-MCNC: 117 MG/DL — HIGH (ref 70–99)
HCT VFR BLD CALC: 34.1 % — LOW (ref 39–50)
HCT VFR BLD CALC: 34.1 % — LOW (ref 39–50)
HGB BLD-MCNC: 11.3 G/DL — LOW (ref 13–17)
HGB BLD-MCNC: 11.3 G/DL — LOW (ref 13–17)
MAGNESIUM SERPL-MCNC: 1.7 MG/DL — SIGNIFICANT CHANGE UP (ref 1.6–2.6)
MAGNESIUM SERPL-MCNC: 1.7 MG/DL — SIGNIFICANT CHANGE UP (ref 1.6–2.6)
MCHC RBC-ENTMCNC: 31 PG — SIGNIFICANT CHANGE UP (ref 27–34)
MCHC RBC-ENTMCNC: 31 PG — SIGNIFICANT CHANGE UP (ref 27–34)
MCHC RBC-ENTMCNC: 33.1 GM/DL — SIGNIFICANT CHANGE UP (ref 32–36)
MCHC RBC-ENTMCNC: 33.1 GM/DL — SIGNIFICANT CHANGE UP (ref 32–36)
MCV RBC AUTO: 93.7 FL — SIGNIFICANT CHANGE UP (ref 80–100)
MCV RBC AUTO: 93.7 FL — SIGNIFICANT CHANGE UP (ref 80–100)
NRBC # BLD: 0 /100 WBCS — SIGNIFICANT CHANGE UP (ref 0–0)
NRBC # BLD: 0 /100 WBCS — SIGNIFICANT CHANGE UP (ref 0–0)
PHOSPHATE SERPL-MCNC: 3.9 MG/DL — SIGNIFICANT CHANGE UP (ref 2.5–4.5)
PHOSPHATE SERPL-MCNC: 3.9 MG/DL — SIGNIFICANT CHANGE UP (ref 2.5–4.5)
PLATELET # BLD AUTO: 385 K/UL — SIGNIFICANT CHANGE UP (ref 150–400)
PLATELET # BLD AUTO: 385 K/UL — SIGNIFICANT CHANGE UP (ref 150–400)
POTASSIUM SERPL-MCNC: 4.4 MMOL/L — SIGNIFICANT CHANGE UP (ref 3.5–5.3)
POTASSIUM SERPL-MCNC: 4.4 MMOL/L — SIGNIFICANT CHANGE UP (ref 3.5–5.3)
POTASSIUM SERPL-SCNC: 4.4 MMOL/L — SIGNIFICANT CHANGE UP (ref 3.5–5.3)
POTASSIUM SERPL-SCNC: 4.4 MMOL/L — SIGNIFICANT CHANGE UP (ref 3.5–5.3)
RBC # BLD: 3.64 M/UL — LOW (ref 4.2–5.8)
RBC # BLD: 3.64 M/UL — LOW (ref 4.2–5.8)
RBC # FLD: 13.9 % — SIGNIFICANT CHANGE UP (ref 10.3–14.5)
RBC # FLD: 13.9 % — SIGNIFICANT CHANGE UP (ref 10.3–14.5)
SODIUM SERPL-SCNC: 134 MMOL/L — LOW (ref 135–145)
SODIUM SERPL-SCNC: 134 MMOL/L — LOW (ref 135–145)
WBC # BLD: 8.24 K/UL — SIGNIFICANT CHANGE UP (ref 3.8–10.5)
WBC # BLD: 8.24 K/UL — SIGNIFICANT CHANGE UP (ref 3.8–10.5)
WBC # FLD AUTO: 8.24 K/UL — SIGNIFICANT CHANGE UP (ref 3.8–10.5)
WBC # FLD AUTO: 8.24 K/UL — SIGNIFICANT CHANGE UP (ref 3.8–10.5)

## 2023-11-22 PROCEDURE — 99239 HOSP IP/OBS DSCHRG MGMT >30: CPT

## 2023-11-22 RX ORDER — KETOROLAC TROMETHAMINE 30 MG/ML
10 SYRINGE (ML) INJECTION ONCE
Refills: 0 | Status: DISCONTINUED | OUTPATIENT
Start: 2023-11-22 | End: 2023-11-22

## 2023-11-22 RX ADMIN — Medication 1000 MILLIGRAM(S): at 08:42

## 2023-11-22 RX ADMIN — PIPERACILLIN AND TAZOBACTAM 25 GRAM(S): 4; .5 INJECTION, POWDER, LYOPHILIZED, FOR SOLUTION INTRAVENOUS at 07:06

## 2023-11-22 RX ADMIN — Medication 1000 MILLIGRAM(S): at 00:45

## 2023-11-22 RX ADMIN — Medication 300 MILLIGRAM(S): at 12:39

## 2023-11-22 RX ADMIN — Medication 1000 MILLIGRAM(S): at 09:42

## 2023-11-22 RX ADMIN — Medication 1000 MILLIGRAM(S): at 00:15

## 2023-11-22 RX ADMIN — Medication 300 MILLIGRAM(S): at 01:28

## 2023-11-22 RX ADMIN — Medication 1 TABLET(S): at 12:46

## 2023-11-22 RX ADMIN — HEPARIN SODIUM 5000 UNIT(S): 5000 INJECTION INTRAVENOUS; SUBCUTANEOUS at 06:30

## 2023-11-22 RX ADMIN — Medication 10 MILLIGRAM(S): at 02:52

## 2023-11-22 RX ADMIN — Medication 10 MILLIGRAM(S): at 03:44

## 2023-11-22 RX ADMIN — SPIRONOLACTONE 100 MILLIGRAM(S): 25 TABLET, FILM COATED ORAL at 08:43

## 2023-11-22 NOTE — PROGRESS NOTE ADULT - PROBLEM SELECTOR PLAN 2
~1 week of significant left leg swelling, 2-3+ pitting edema likely 2/2 cellulitis in addition to history of lymphedema however pt notes has been less physically active with less ambulation and has history of malignancy. Duplex left lower leg showed no DVT.  - vascular consult recs above  - leg elevation and continue compression stockings for right leg and ACE bandage of left

## 2023-11-22 NOTE — PROGRESS NOTE ADULT - PROBLEM SELECTOR PLAN 3
1/4 SIRS (leukocytosis) likely in setting of cellulitis. Reportedly had fever at home however no fever in ED  - f/u blood cultures; NGTD  - abx as above

## 2023-11-22 NOTE — PROGRESS NOTE ADULT - PROBLEM SELECTOR PLAN 5
s/p prostatectomy, radiation and hormone therapy, follows a urologist at [place]  - outpatient follow up

## 2023-11-22 NOTE — PROGRESS NOTE ADULT - SUBJECTIVE AND OBJECTIVE BOX
***Note in progress***    OVERNIGHT EVENTS: NAEO    SUBJECTIVE / INTERVAL HPI: Patient seen and examined at bedside. Patient denying chest pain, SOB, palpitations, cough. Patient denies fever, chills, HA, Dizziness, N/V, abdominal pain, diarrhea, constipation, hematochezia/melena, dysuria, hematuria, new onset weakness/numbness, LE pain and/or swelling.    Remaining ROS negative       PHYSICAL EXAM:    General:NAD.   HEENT: NC/AT; PERRL, anicteric sclera; MMM  Neck: supple  Cardiovascular: +S1/S2, RRR  Respiratory: CTA B/L; no W/R/R  Gastrointestinal: soft, NT/ND; +BSx4  Extremities: WWP; no edema, clubbing or cyanosis  Vascular: 2+ radial, DP/PT pulses B/L  Neurological: AAOx3; no focal deficits  Psychiatric: pleasant mood and affect  Dermatologic: no appreciable wounds or damage to the skin    VITAL SIGNS:  Vital Signs Last 24 Hrs  T(C): 36.7 (22 Nov 2023 05:33), Max: 36.7 (22 Nov 2023 05:33)  T(F): 98.1 (22 Nov 2023 05:33), Max: 98.1 (22 Nov 2023 05:33)  HR: 72 (22 Nov 2023 05:33) (65 - 72)  BP: 106/62 (22 Nov 2023 05:33) (106/62 - 118/55)  BP(mean): --  RR: 18 (22 Nov 2023 05:33) (18 - 18)  SpO2: 95% (22 Nov 2023 05:33) (95% - 97%)    Parameters below as of 21 Nov 2023 20:45  Patient On (Oxygen Delivery Method): room air          MEDICATIONS:  MEDICATIONS  (STANDING):  heparin   Injectable 5000 Unit(s) SubCutaneous every 8 hours  influenza  Vaccine (HIGH DOSE) 0.7 milliLiter(s) IntraMuscular once  multivitamin  Chewable 1 Tablet(s) Oral daily  piperacillin/tazobactam IVPB.. 3.375 Gram(s) IV Intermittent every 8 hours  spironolactone 100 milliGRAM(s) Oral daily  vancomycin  IVPB 1500 milliGRAM(s) IV Intermittent every 12 hours    MEDICATIONS  (PRN):  acetaminophen     Tablet .. 1000 milliGRAM(s) Oral every 6 hours PRN Temp greater or equal to 38C (100.4F), Mild Pain (1 - 3)      ALLERGIES:  Allergies    No Known Allergies    Intolerances        LABS:                        11.2   10.46 )-----------( 372      ( 21 Nov 2023 09:30 )             34.4     11-21    137  |  104  |  15  ----------------------------<  141<H>  4.6   |  23  |  1.00    Ca    9.6      21 Nov 2023 09:30  Phos  4.1     11-21  Mg     1.9     11-21        Urinalysis Basic - ( 21 Nov 2023 09:30 )    Color: x / Appearance: x / SG: x / pH: x  Gluc: 141 mg/dL / Ketone: x  / Bili: x / Urobili: x   Blood: x / Protein: x / Nitrite: x   Leuk Esterase: x / RBC: x / WBC x   Sq Epi: x / Non Sq Epi: x / Bacteria: x      CAPILLARY BLOOD GLUCOSE          RADIOLOGY & ADDITIONAL TESTS: Reviewed. OVERNIGHT EVENTS: NAEO    SUBJECTIVE / INTERVAL HPI: Patient seen and examined at bedside. Tolerating LLE pain well controlled with Tylenol 1 g and denies paresthesias. Patient denying chest pain, SOB, palpitations, cough. Patient denies fever, chills, HA, Dizziness, N/V, abdominal pain, diarrhea, constipation, hematochezia/melena, dysuria, hematuria, new onset weakness/numbness. Remaining ROS negative     PHYSICAL EXAM:  General: NAD, well groomed, good hygiene  HEENT: NC/AT; PERRL, anicteric sclera; MMM  Neck: supple  Cardiovascular: +S1/S2, RRR  Respiratory: CTA B/L; no W/R/R  Gastrointestinal: soft, NT/ND; +BSx4; no suprapubic pain  Extremities: WWP; LLE wrapped with ACE bandage with improving purulent cellulitis and yellow pus filled blister is smaller in size; improving edema and erythema  Vascular: 2+ radial, DP/PT pulses B/L  Neurological: AAOx3; no focal deficits  Psychiatric: pleasant mood and affect  Dermatologic: no appreciable wounds or damage to the skin    VITAL SIGNS:  Vital Signs Last 24 Hrs  T(C): 36.7 (22 Nov 2023 05:33), Max: 36.7 (22 Nov 2023 05:33)  T(F): 98.1 (22 Nov 2023 05:33), Max: 98.1 (22 Nov 2023 05:33)  HR: 72 (22 Nov 2023 05:33) (65 - 72)  BP: 106/62 (22 Nov 2023 05:33) (106/62 - 118/55)  BP(mean): --  RR: 18 (22 Nov 2023 05:33) (18 - 18)  SpO2: 95% (22 Nov 2023 05:33) (95% - 97%)  Parameters below as of 21 Nov 2023 20:45  Patient On (Oxygen Delivery Method): room air    MEDICATIONS:  MEDICATIONS  (STANDING):  heparin   Injectable 5000 Unit(s) SubCutaneous every 8 hours  influenza  Vaccine (HIGH DOSE) 0.7 milliLiter(s) IntraMuscular once  multivitamin  Chewable 1 Tablet(s) Oral daily  piperacillin/tazobactam IVPB.. 3.375 Gram(s) IV Intermittent every 8 hours  spironolactone 100 milliGRAM(s) Oral daily  vancomycin  IVPB 1500 milliGRAM(s) IV Intermittent every 12 hours    MEDICATIONS  (PRN):  acetaminophen     Tablet .. 1000 milliGRAM(s) Oral every 6 hours PRN Temp greater or equal to 38C (100.4F), Mild Pain (1 - 3)    ALLERGIES:  Allergies  No Known Allergies  Intolerances    LABS:                        11.2   10.46 )-----------( 372      ( 21 Nov 2023 09:30 )             34.4     11-21    137  |  104  |  15  ----------------------------<  141<H>  4.6   |  23  |  1.00    Ca    9.6      21 Nov 2023 09:30  Phos  4.1     11-21  Mg     1.9     11-21    Urinalysis Basic - ( 21 Nov 2023 09:30 )  Color: x / Appearance: x / SG: x / pH: x  Gluc: 141 mg/dL / Ketone: x  / Bili: x / Urobili: x   Blood: x / Protein: x / Nitrite: x   Leuk Esterase: x / RBC: x / WBC x   Sq Epi: x / Non Sq Epi: x / Bacteria: x  CAPILLARY BLOOD GLUCOSE  RADIOLOGY & ADDITIONAL TESTS: Reviewed.  US Duplex LLE 11/20/23:  No thrombosis in sampled deep venous vasculature. Posterior tibial and   peroneal veins are not evaluated due to overlying wound and soft tissue   edema.    CT LLE w/ IV Contrast 11/20/23:  Subcutaneous soft tissue edema throughout the left lower extremity that   is most prominent at the level of the ankle. Possibly related to   cellulitis. No encapsulated peripherally enhancing fluid collection. No   soft tissue gas to suggest the presence of necrotizing fasciitis.

## 2023-11-22 NOTE — PROGRESS NOTE ADULT - ASSESSMENT
67yo male with PMHx chronic lymphedema prostate ca (s/p prostatectomy radiation and hormone therapy), HTN, hyperaldosteronism (endo Dr. Suad Feldman) presents with ~1 week left lower leg swelling, erythema and tenderness, admitted for management of cellulitis.

## 2023-11-22 NOTE — DISCHARGE NOTE NURSING/CASE MANAGEMENT/SOCIAL WORK - PATIENT PORTAL LINK FT
You can access the FollowMyHealth Patient Portal offered by [facility] by registering at the following website: [email or web address]. By joining [employer]’s FollowMyHealth portal, you will also be able to view your health information using other applications (apps) compatible with our system.

## 2023-11-22 NOTE — PROGRESS NOTE ADULT - PROBLEM SELECTOR PLAN 7
On home nifedipine 120mg daily and metoprolol succ 25mg daily. Normotensive at this time  - Restart home BP as needed, normotensive at this time    #Alcohol use disorder  Drinks 1-2L wine regularly however in past week has not had any alcohol. No withdrawal symptoms and never had withdrawals in past. Low suspicion for withdrawal as last drank 1 week ago  - Education and counseling provided on alcohol reduction

## 2023-11-22 NOTE — PROGRESS NOTE ADULT - PROBLEM SELECTOR PLAN 1
~1 week duration of left lower leg swelling, erythema with fever and chills, [facility] for past 2 days presenting with persistent swelling, erythema, and pain. On exam left leg with 2-3+ pitting edema with significant erythema and warmth with left lower medial leg multiple healing wound with small bullae with yellow fluid (previous very large bullae with yellow fluid that has since popped). No fever or chills at this time and meeting 1/4 SIRS criteria (leukocytosis). CT negative for gas. Given vanc and zosyn in ED. Duplex of LLE no DVT. Elevated ESR 97, elevated .3.  - continue vanc 1250mg q12h and zosyn 3.375g q8h; d/c on oral Bactrim DS and Keflex  - encourage PO hydration  - follow up blood culture  - wound dressing care per vascular  - f/u ESR/CRP ~1 week duration of left lower leg swelling, erythema with fever and chills, started [facility] for past 2 days presenting with persistent swelling, erythema, and pain. On exam left leg with 2-3+ pitting edema with significant erythema and warmth with left lower medial leg multiple healing wound with small bullae with yellow fluid (previous very large bullae with yellow fluid that has since popped). No fever or chills at this time and meeting 1/4 SIRS criteria (leukocytosis). CT negative for gas. Given vanc and zosyn in ED. Duplex of LLE no DVT. Elevated ESR 97, elevated .3.  - redosed vanc 1.5 g q12h and zosyn 3.375g q8h; d/c on oral Bactrim DS and Keflex total of 10 days treatment for purulent cellulitis  - encourage PO hydration  - NGTD blood culture  - wound dressing care per vascular

## 2023-11-22 NOTE — PROGRESS NOTE ADULT - PROBLEM SELECTOR PLAN 8
Pt reports was told his creatinine has been elevated in past however unsure of baseline. Cr 1.25 on admission. Pt endorses poor PO intake over last week. Likely dehydrated. Received 1L NS  - Trend Cr  - Avoid nephrotoxic medications  - Encourage PO hydration and intake

## 2023-11-22 NOTE — DISCHARGE NOTE NURSING/CASE MANAGEMENT/SOCIAL WORK - NSDCFUADDAPPT_GEN_ALL_CORE_FT
For wound care clinics, our [facility] Vascular Team typically refers to:   Dr. Jesus Rubio at [facility] ([address]; [phone]) or  Dr. Basil Luong at [facility] ([address], [address]; [phone]).     You have previously been seen at [facility] for lymphedema therapy and you are welcome to reach out to the lymphedema therapy clinic to see if there is a wound care clinic within the [facility] system.

## 2023-11-22 NOTE — PROGRESS NOTE ADULT - PROBLEM SELECTOR PLAN 4
History chronic lymphedema and wears compression stockings daily however has not been able to fit left compression stockings due to swelling. US LLE shows no DVTs.  - Vascular consult given severe left lower leg swelling with history of lymphedema  - bilateral leg elevation and continue right leg compression stockings and ACE bandage of left

## 2023-11-22 NOTE — PROGRESS NOTE ADULT - PROBLEM SELECTOR PLAN 9
Fluids: s/p 1L NS  Electrolytes: Replete to keep K>4 Phos >3 Mg>2  Nutrition: DASH  DVT ppx: Heparin  Dispo: F

## 2023-11-25 LAB
CULTURE RESULTS: SIGNIFICANT CHANGE UP
SPECIMEN SOURCE: SIGNIFICANT CHANGE UP

## 2023-11-28 ENCOUNTER — APPOINTMENT (OUTPATIENT)
Dept: WOUND CARE | Facility: CLINIC | Age: 68
End: 2023-11-28

## 2023-11-28 ENCOUNTER — OUTPATIENT (OUTPATIENT)
Dept: OUTPATIENT SERVICES | Facility: HOSPITAL | Age: 68
LOS: 1 days | End: 2023-11-28
Payer: MEDICARE

## 2023-11-28 VITALS
SYSTOLIC BLOOD PRESSURE: 124 MMHG | DIASTOLIC BLOOD PRESSURE: 65 MMHG | WEIGHT: 195 LBS | HEART RATE: 58 BPM | TEMPERATURE: 97.8 F | BODY MASS INDEX: 27.3 KG/M2 | RESPIRATION RATE: 18 BRPM | OXYGEN SATURATION: 98 % | HEIGHT: 71 IN

## 2023-11-28 DIAGNOSIS — E26.9 HYPERALDOSTERONISM, UNSPECIFIED: ICD-10-CM

## 2023-11-28 DIAGNOSIS — N18.9 CHRONIC KIDNEY DISEASE, UNSPECIFIED: ICD-10-CM

## 2023-11-28 DIAGNOSIS — F10.90 ALCOHOL USE, UNSPECIFIED, UNCOMPLICATED: ICD-10-CM

## 2023-11-28 DIAGNOSIS — Z00.00 ENCOUNTER FOR GENERAL ADULT MEDICAL EXAMINATION WITHOUT ABNORMAL FINDINGS: ICD-10-CM

## 2023-11-28 DIAGNOSIS — Z79.82 LONG TERM (CURRENT) USE OF ASPIRIN: ICD-10-CM

## 2023-11-28 DIAGNOSIS — E86.0 DEHYDRATION: ICD-10-CM

## 2023-11-28 DIAGNOSIS — Z92.21 PERSONAL HISTORY OF ANTINEOPLASTIC CHEMOTHERAPY: ICD-10-CM

## 2023-11-28 DIAGNOSIS — Z92.3 PERSONAL HISTORY OF IRRADIATION: ICD-10-CM

## 2023-11-28 DIAGNOSIS — L03.116 CELLULITIS OF LEFT LOWER LIMB: ICD-10-CM

## 2023-11-28 DIAGNOSIS — Z78.9 OTHER SPECIFIED HEALTH STATUS: ICD-10-CM

## 2023-11-28 DIAGNOSIS — I97.89 OTHER POSTPROCEDURAL COMPLICATIONS AND DISORDERS OF THE CIRCULATORY SYSTEM, NOT ELSEWHERE CLASSIFIED: ICD-10-CM

## 2023-11-28 DIAGNOSIS — I12.9 HYPERTENSIVE CHRONIC KIDNEY DISEASE WITH STAGE 1 THROUGH STAGE 4 CHRONIC KIDNEY DISEASE, OR UNSPECIFIED CHRONIC KIDNEY DISEASE: ICD-10-CM

## 2023-11-28 DIAGNOSIS — Z41.8 ENCOUNTER FOR OTHER PROCEDURES FOR PURPOSES OTHER THAN REMEDYING HEALTH STATE: ICD-10-CM

## 2023-11-28 DIAGNOSIS — R23.8 OTHER SKIN CHANGES: ICD-10-CM

## 2023-11-28 DIAGNOSIS — R53.81 OTHER MALAISE: ICD-10-CM

## 2023-11-28 DIAGNOSIS — L97.229 NON-PRESSURE CHRONIC ULCER OF LEFT CALF WITH UNSPECIFIED SEVERITY: ICD-10-CM

## 2023-11-28 DIAGNOSIS — Z85.46 PERSONAL HISTORY OF MALIGNANT NEOPLASM OF PROSTATE: ICD-10-CM

## 2023-11-28 PROBLEM — I10 ESSENTIAL (PRIMARY) HYPERTENSION: Chronic | Status: ACTIVE | Noted: 2023-11-20

## 2023-11-28 PROBLEM — I89.0 LYMPHEDEMA, NOT ELSEWHERE CLASSIFIED: Chronic | Status: ACTIVE | Noted: 2023-11-20

## 2023-11-28 PROBLEM — Z86.39 PERSONAL HISTORY OF OTHER ENDOCRINE, NUTRITIONAL AND METABOLIC DISEASE: Chronic | Status: ACTIVE | Noted: 2023-11-20

## 2023-11-28 PROBLEM — C61 MALIGNANT NEOPLASM OF PROSTATE: Chronic | Status: ACTIVE | Noted: 2023-11-20

## 2023-11-28 PROCEDURE — 29580 STRAPPING UNNA BOOT: CPT

## 2023-11-28 PROCEDURE — G0463: CPT

## 2023-11-28 RX ORDER — NIFEDIPINE 60 MG
60 TABLET, EXTENDED RELEASE ORAL
Refills: 0 | Status: ACTIVE | COMMUNITY

## 2023-11-28 RX ORDER — ASPIRIN 81 MG
81 TABLET, DELAYED RELEASE (ENTERIC COATED) ORAL
Refills: 0 | Status: ACTIVE | COMMUNITY

## 2023-11-28 RX ORDER — METOPROLOL SUCCINATE 25 MG/1
25 TABLET, EXTENDED RELEASE ORAL
Refills: 0 | Status: ACTIVE | COMMUNITY

## 2023-11-29 DIAGNOSIS — I89.0 LYMPHEDEMA, NOT ELSEWHERE CLASSIFIED: ICD-10-CM

## 2023-11-29 DIAGNOSIS — L97.222 NON-PRESSURE CHRONIC ULCER OF LEFT CALF WITH FAT LAYER EXPOSED: ICD-10-CM

## 2023-11-29 DIAGNOSIS — I87.2 VENOUS INSUFFICIENCY (CHRONIC) (PERIPHERAL): ICD-10-CM

## 2023-12-07 ENCOUNTER — APPOINTMENT (OUTPATIENT)
Dept: WOUND CARE | Facility: CLINIC | Age: 68
End: 2023-12-07

## 2023-12-07 ENCOUNTER — OUTPATIENT (OUTPATIENT)
Dept: OUTPATIENT SERVICES | Facility: HOSPITAL | Age: 68
LOS: 1 days | End: 2023-12-07
Payer: MEDICARE

## 2023-12-07 VITALS
OXYGEN SATURATION: 98 % | DIASTOLIC BLOOD PRESSURE: 74 MMHG | SYSTOLIC BLOOD PRESSURE: 120 MMHG | RESPIRATION RATE: 18 BRPM | TEMPERATURE: 97.2 F | HEIGHT: 71 IN | WEIGHT: 195 LBS | BODY MASS INDEX: 27.3 KG/M2 | HEART RATE: 78 BPM

## 2023-12-07 DIAGNOSIS — L97.222 NON-PRESSURE CHRONIC ULCER OF LEFT CALF WITH FAT LAYER EXPOSED: ICD-10-CM

## 2023-12-07 DIAGNOSIS — Z90.79 ACQUIRED ABSENCE OF OTHER GENITAL ORGAN(S): Chronic | ICD-10-CM

## 2023-12-07 DIAGNOSIS — I89.0 LYMPHEDEMA, NOT ELSEWHERE CLASSIFIED: ICD-10-CM

## 2023-12-07 DIAGNOSIS — I87.2 VENOUS INSUFFICIENCY (CHRONIC) (PERIPHERAL): ICD-10-CM

## 2023-12-07 DIAGNOSIS — Z00.00 ENCOUNTER FOR GENERAL ADULT MEDICAL EXAMINATION WITHOUT ABNORMAL FINDINGS: ICD-10-CM

## 2023-12-07 PROCEDURE — 29580 STRAPPING UNNA BOOT: CPT

## 2023-12-07 PROCEDURE — G0463: CPT

## 2023-12-13 PROCEDURE — 86803 HEPATITIS C AB TEST: CPT

## 2023-12-13 PROCEDURE — 97161 PT EVAL LOW COMPLEX 20 MIN: CPT

## 2023-12-13 PROCEDURE — 36415 COLL VENOUS BLD VENIPUNCTURE: CPT

## 2023-12-13 PROCEDURE — 80202 ASSAY OF VANCOMYCIN: CPT

## 2023-12-13 PROCEDURE — 85027 COMPLETE CBC AUTOMATED: CPT

## 2023-12-13 PROCEDURE — 99285 EMERGENCY DEPT VISIT HI MDM: CPT

## 2023-12-13 PROCEDURE — 73701 CT LOWER EXTREMITY W/DYE: CPT | Mod: MA

## 2023-12-13 PROCEDURE — 85025 COMPLETE CBC W/AUTO DIFF WBC: CPT

## 2023-12-13 PROCEDURE — 84100 ASSAY OF PHOSPHORUS: CPT

## 2023-12-13 PROCEDURE — 93971 EXTREMITY STUDY: CPT

## 2023-12-13 PROCEDURE — 85652 RBC SED RATE AUTOMATED: CPT

## 2023-12-13 PROCEDURE — 87040 BLOOD CULTURE FOR BACTERIA: CPT

## 2023-12-13 PROCEDURE — 86140 C-REACTIVE PROTEIN: CPT

## 2023-12-13 PROCEDURE — 96374 THER/PROPH/DIAG INJ IV PUSH: CPT

## 2023-12-13 PROCEDURE — 83735 ASSAY OF MAGNESIUM: CPT

## 2023-12-13 PROCEDURE — 80048 BASIC METABOLIC PNL TOTAL CA: CPT

## 2023-12-13 PROCEDURE — 96375 TX/PRO/DX INJ NEW DRUG ADDON: CPT

## 2023-12-14 ENCOUNTER — OUTPATIENT (OUTPATIENT)
Dept: OUTPATIENT SERVICES | Facility: HOSPITAL | Age: 68
LOS: 1 days | End: 2023-12-14
Payer: MEDICARE

## 2023-12-14 ENCOUNTER — APPOINTMENT (OUTPATIENT)
Dept: WOUND CARE | Facility: CLINIC | Age: 68
End: 2023-12-14

## 2023-12-14 VITALS
BODY MASS INDEX: 27.3 KG/M2 | DIASTOLIC BLOOD PRESSURE: 67 MMHG | OXYGEN SATURATION: 100 % | HEART RATE: 61 BPM | TEMPERATURE: 98.1 F | HEIGHT: 71 IN | RESPIRATION RATE: 16 BRPM | SYSTOLIC BLOOD PRESSURE: 106 MMHG | WEIGHT: 195 LBS

## 2023-12-14 DIAGNOSIS — Z00.00 ENCOUNTER FOR GENERAL ADULT MEDICAL EXAMINATION WITHOUT ABNORMAL FINDINGS: ICD-10-CM

## 2023-12-14 PROCEDURE — G0463: CPT

## 2023-12-14 PROCEDURE — 29580 STRAPPING UNNA BOOT: CPT

## 2023-12-14 NOTE — PHYSICAL EXAM
[FreeTextEntry1] : medial leg [de-identified] : healed  [TWNoteComboBox1] : Left 12-Jun-2017 17:17

## 2023-12-14 NOTE — HISTORY OF PRESENT ILLNESS
[FreeTextEntry1] :  Patient presents to clinic for management of his leg wound. Patient states that he has been going to [facility] ED for this issue and they referred him here. Patient states that two weeks ago he went to [facility] and they gave him antibiotics which he has been taking. He believed the antibiotic was called Bacitracin, but after further conversation it may be Bactrim. He states that he does have VNS nurses that come every other day and wash his wounds with vashe, and wraps with xeroform, kerlix and ACE. They have been coming once a week to apply unna boot. Patient states his leg is not painful. He does walk a lot. Patient does not have other pedal complaints.

## 2023-12-14 NOTE — ASSESSMENT
[FreeTextEntry1] : Physical examination Vasc: DP and PT pulses palpable 2/4. TG warm to warm with no increase in temp to L leg wound Derm: Venous stasis ulcer noted. Measuring 13 cm x 10 cx x superficial. Weeping noted. No fluctuance noted. No periwound erythema noted. Eshcar base with granular islands noted. Neuro: gross sensation intact Msk: Deferred  A:  Venous stasis wound L  Lymphedema   P:  Patient and patients chart were reviewed  Diagnosis was discussed with patient  Patient was advised to finish taking the antibiotics given from [facility] Patient was educated about Unna boots and treament plan  Patient was advised to have VNS come once a week to change unna boot, otherwise keep dressing intact  Unna boot was applied to patients left leg, covered with coban and ACE bandage.  Patient was advised to not walk a lot while in unna boot Patient was told to keep dressing dry and to take off if he gets it wet.  Patient was educated on lymphedema pumps and Rep was messaged about patient. Will be in touch with patient to get him set up. ED precautions were given as well as if his leg does start to hurt to take dressing off Patient to follow up in 1 week   Note written by Tamiko Corey PGY 1

## 2023-12-15 DIAGNOSIS — I89.0 LYMPHEDEMA, NOT ELSEWHERE CLASSIFIED: ICD-10-CM

## 2023-12-15 DIAGNOSIS — L97.212 NON-PRESSURE CHRONIC ULCER OF RIGHT CALF WITH FAT LAYER EXPOSED: ICD-10-CM

## 2023-12-21 ENCOUNTER — OUTPATIENT (OUTPATIENT)
Dept: OUTPATIENT SERVICES | Facility: HOSPITAL | Age: 68
LOS: 1 days | End: 2023-12-21
Payer: MEDICARE

## 2023-12-21 ENCOUNTER — APPOINTMENT (OUTPATIENT)
Dept: WOUND CARE | Facility: CLINIC | Age: 68
End: 2023-12-21

## 2023-12-21 VITALS
HEIGHT: 71 IN | SYSTOLIC BLOOD PRESSURE: 112 MMHG | DIASTOLIC BLOOD PRESSURE: 65 MMHG | HEART RATE: 70 BPM | BODY MASS INDEX: 28 KG/M2 | RESPIRATION RATE: 18 BRPM | OXYGEN SATURATION: 97 % | TEMPERATURE: 97.1 F | WEIGHT: 200 LBS

## 2023-12-21 DIAGNOSIS — I89.0 LYMPHEDEMA, NOT ELSEWHERE CLASSIFIED: ICD-10-CM

## 2023-12-21 DIAGNOSIS — I87.2 VENOUS INSUFFICIENCY (CHRONIC) (PERIPHERAL): ICD-10-CM

## 2023-12-21 DIAGNOSIS — Z00.00 ENCOUNTER FOR GENERAL ADULT MEDICAL EXAMINATION WITHOUT ABNORMAL FINDINGS: ICD-10-CM

## 2023-12-21 DIAGNOSIS — Z90.79 ACQUIRED ABSENCE OF OTHER GENITAL ORGAN(S): Chronic | ICD-10-CM

## 2023-12-21 PROCEDURE — G0463: CPT

## 2023-12-21 PROCEDURE — 29580 STRAPPING UNNA BOOT: CPT

## 2023-12-21 NOTE — HISTORY OF PRESENT ILLNESS
[FreeTextEntry1] :  Patient presents to clinic for management of his leg wound. Patient states that he has been going to [facility] ED for this issue and they referred him here. Patient states that two weeks ago he went to [facility] and they gave him antibiotics which he has been taking. He believed the antibiotic was called Bacitracin, but after further conversation it may be Bactrim. He states that he does have VNS nurses that come every other day and wash his wounds with vashe, and wraps with xeroform, kerlix and ACE. They have been coming once a week to apply unna boot. Patient states his leg is not painful. He does walk a lot.  Patient states that he does not care to have another unna boot applied. Patient does not have other pedal complaints.

## 2023-12-21 NOTE — ASSESSMENT
[FreeTextEntry1] : Physical examination Vasc: DP and PT pulses palpable 2/4. TG warm to warm with no increase in temp to L leg wound Derm: Venous stasis ulcer noted. Measuring 13 cm x 10 cx x superficial. No Weeping noted. No fluctuance noted. No periwound erythema noted. Eshcar base with granular islands noted. Neuro: gross sensation intact Msk: Deferred  A:  Venous stasis wound L  Lymphedema   P:  Patient and patients chart were reviewed  Diagnosis was discussed with patient  Patient was advised to finish taking the antibiotics given from [facility] Patient was educated about Unna boots and treament plan  Patient was advised to have VNS come once a week to change unna boot, otherwise keep dressing intact  Unna boot was applied to patients left leg, covered with coban and ACE bandage.  Patient was advised to not walk a lot while in unna boot Patient was told to keep dressing dry and to take off if he gets it wet.  Patient was educated on lymphedema pumps and Rep was messaged about patient. Will be in touch with patient to get him set up. ED precautions were given as well as if his leg does start to hurt to take dressing off Patient to follow up in 1 week   Note written by Tamiko Corey PGY 1

## 2023-12-28 ENCOUNTER — OUTPATIENT (OUTPATIENT)
Dept: OUTPATIENT SERVICES | Facility: HOSPITAL | Age: 68
LOS: 1 days | End: 2023-12-28
Payer: MEDICARE

## 2023-12-28 ENCOUNTER — APPOINTMENT (OUTPATIENT)
Dept: WOUND CARE | Facility: CLINIC | Age: 68
End: 2023-12-28

## 2023-12-28 VITALS
HEART RATE: 67 BPM | WEIGHT: 200 LBS | TEMPERATURE: 97.4 F | HEIGHT: 71 IN | RESPIRATION RATE: 18 BRPM | BODY MASS INDEX: 28 KG/M2 | OXYGEN SATURATION: 96 % | SYSTOLIC BLOOD PRESSURE: 122 MMHG | DIASTOLIC BLOOD PRESSURE: 69 MMHG

## 2023-12-28 VITALS
OXYGEN SATURATION: 97 % | HEART RATE: 91 BPM | WEIGHT: 200 LBS | TEMPERATURE: 97.2 F | HEIGHT: 71 IN | RESPIRATION RATE: 18 BRPM | DIASTOLIC BLOOD PRESSURE: 72 MMHG | SYSTOLIC BLOOD PRESSURE: 159 MMHG | BODY MASS INDEX: 28 KG/M2

## 2023-12-28 DIAGNOSIS — Z00.00 ENCOUNTER FOR GENERAL ADULT MEDICAL EXAMINATION WITHOUT ABNORMAL FINDINGS: ICD-10-CM

## 2023-12-28 DIAGNOSIS — Z90.79 ACQUIRED ABSENCE OF OTHER GENITAL ORGAN(S): Chronic | ICD-10-CM

## 2023-12-28 PROCEDURE — G0463: CPT

## 2023-12-28 PROCEDURE — 29580 STRAPPING UNNA BOOT: CPT

## 2023-12-28 NOTE — HISTORY OF PRESENT ILLNESS
[FreeTextEntry1] :  Patient presents to clinic for management of his leg wound. Patient states that he has been going to [facility] ED for this issue and they referred him here. Patient states that three weeks ago he went to [facility] and they gave him antibiotics which he has finished. He states that he does have VNS nurses that come every other day and wash his wounds with vashe, and wraps with xeroform, kerlix and ACE. They have been coming once a week to apply unna boot. Patient states his leg is not painful. He does walk a lot. Reports noticing improvement with the leg swelling after wearing unna boots. Pt waiting to hear back from rep for  Lymphedema pumps.Patient does not have other pedal complaints.

## 2023-12-28 NOTE — ASSESSMENT
[FreeTextEntry1] : Physical examination Vasc: DP and PT pulses palpable 2/4. TG warm to warm with no increase in temp to L leg wound Derm: Venous stasis ulcer noted. Measuring 13 cm x 10 cx x superficial. No Weeping noted. No fluctuance noted. No periwound erythema noted. Eshcar base with granular islands noted. Neuro: gross sensation intact Msk: Deferred  A:  Venous stasis wound L  Lymphedema   P:  Patient and patients chart were reviewed  Diagnosis was discussed with patient  Patient was educated about Unna boots and treament plan  Patient was advised to have VNS come once a week to change unna boot, otherwise keep dressing intact  Unna boot was applied to patients left leg, covered with coban  Patient was advised to not walk a lot while in unna boot Patient was told to keep dressing dry and to take off if he gets it wet.  Patient was educated on lymphedema pumps and Rep was messaged about patient.  ED precautions were given as well as if his leg does start to hurt to take dressing off Patient to follow up in 1 week   Note Written by Madeleine Escobar

## 2023-12-28 NOTE — HISTORY OF PRESENT ILLNESS
[FreeTextEntry1] :  Patient presents to clinic for management of his leg wound. Patient states that he has been going to [facility] ED for this issue and they referred him here. Patient states that three weeks ago he went to [facility] and they gave him antibiotics which he has finished. He states that he does have VNS nurses that come every other day and wash his wounds with vashe, and wraps with xeroform, kerlix and ACE. They have been coming once a week to apply unna boot. Patient states his leg is not painful. He does walk a lot. Patient does not have other pedal complaints.

## 2023-12-28 NOTE — ASSESSMENT
[FreeTextEntry1] : Physical examination Vasc: DP and PT pulses palpable 2/4. TG warm to warm with no increase in temp to L leg wound Derm: Venous stasis ulcer noted. Measuring 13 cm x 10 cx x superficial. No Weeping noted. No fluctuance noted. No periwound erythema noted. Eshcar base with granular islands noted. Neuro: gross sensation intact Msk: Deferred  A:  Venous stasis wound L  Lymphedema   P:  Patient and patients chart were reviewed  Diagnosis was discussed with patient  Patient was educated about Unna boots and treament plan  Patient was advised to have VNS come once a week to change unna boot, otherwise keep dressing intact  Unna boot was applied to patients left leg, covered with coban and ACE bandage.  Patient was advised to not walk a lot while in unna boot Patient was told to keep dressing dry and to take off if he gets it wet.  Patient was educated on lymphedema pumps and Rep was messaged about patient. Will be in touch with patient to get him set up. ED precautions were given as well as if his leg does start to hurt to take dressing off Patient to follow up in 1 week

## 2023-12-29 DIAGNOSIS — I87.2 VENOUS INSUFFICIENCY (CHRONIC) (PERIPHERAL): ICD-10-CM

## 2024-01-04 ENCOUNTER — APPOINTMENT (OUTPATIENT)
Dept: WOUND CARE | Facility: CLINIC | Age: 69
End: 2024-01-04

## 2024-01-04 ENCOUNTER — OUTPATIENT (OUTPATIENT)
Dept: OUTPATIENT SERVICES | Facility: HOSPITAL | Age: 69
LOS: 1 days | End: 2024-01-04
Payer: MEDICARE

## 2024-01-04 VITALS
TEMPERATURE: 97.3 F | RESPIRATION RATE: 18 BRPM | HEART RATE: 66 BPM | OXYGEN SATURATION: 98 % | SYSTOLIC BLOOD PRESSURE: 119 MMHG | DIASTOLIC BLOOD PRESSURE: 71 MMHG | BODY MASS INDEX: 28 KG/M2 | HEIGHT: 71 IN | WEIGHT: 200 LBS

## 2024-01-04 DIAGNOSIS — Z00.00 ENCOUNTER FOR GENERAL ADULT MEDICAL EXAMINATION WITHOUT ABNORMAL FINDINGS: ICD-10-CM

## 2024-01-04 DIAGNOSIS — Z90.79 ACQUIRED ABSENCE OF OTHER GENITAL ORGAN(S): Chronic | ICD-10-CM

## 2024-01-04 PROCEDURE — G0463: CPT

## 2024-01-04 NOTE — ASSESSMENT
[FreeTextEntry1] : Physical examination Vasc: DP and PT pulses palpable 2/4. TG warm to warm with no increase in temp to L leg wound Derm: Venous stasis ulcer noted. Measuring 13 cm x 10 cx x superficial. No Weeping noted. No fluctuance noted. No periwound erythema noted. Eshcar base with granular islands noted. Neuro: gross sensation intact Msk: Deferred  A:  Venous stasis wound L  Lymphedema   P:  Patient and patients chart were reviewed  Diagnosis was discussed with patient  Patient was educated about Unna boots and treament plan  Patient was advised to have VNS come once a week to change unna boot, otherwise keep dressing intact  Unna boot was applied to patients left leg, covered with coban  Patient was advised to not walk a lot while in unna boot Advised patient to follw-up with his vascular and primary care doctor. Patient was told to keep dressing dry and to take off if he gets it wet.  Patient was educated on lymphedema pumps and Rep was messaged about patient.  ED precautions were given as well as if his leg does start to hurt to take dressing off Patient to follow up in 1 week   Note Written by Madeleine Escobar

## 2024-01-04 NOTE — HISTORY OF PRESENT ILLNESS
[FreeTextEntry1] :  Patient presents to clinic for management of his leg wound. Patient states that he has been going to [facility] ED for this issue and they referred him here. Patient states that three weeks ago he went to [facility] and they gave him antibiotics which he has finished. He states that he does have VNS nurses that come every other day and wash his wounds with vashe, and wraps with xeroform, kerlix and ACE. They have been coming once a week to apply unna boot. Patient states his leg is not painful. Reports noticing improvement with the leg swelling after wearing unna boots. He is scheduled to see qa vascular doctor soon. Pt waiting to hear back from rep for Lymphedema pumps.Patient does not have other pedal complaints.

## 2024-01-08 DIAGNOSIS — E11.9 TYPE 2 DIABETES MELLITUS WITHOUT COMPLICATIONS: ICD-10-CM

## 2024-01-08 DIAGNOSIS — I89.0 LYMPHEDEMA, NOT ELSEWHERE CLASSIFIED: ICD-10-CM

## 2024-01-11 ENCOUNTER — APPOINTMENT (OUTPATIENT)
Dept: WOUND CARE | Facility: CLINIC | Age: 69
End: 2024-01-11

## 2024-01-11 ENCOUNTER — OUTPATIENT (OUTPATIENT)
Dept: OUTPATIENT SERVICES | Facility: HOSPITAL | Age: 69
LOS: 1 days | End: 2024-01-11
Payer: MEDICARE

## 2024-01-11 VITALS
HEIGHT: 71 IN | DIASTOLIC BLOOD PRESSURE: 58 MMHG | WEIGHT: 200 LBS | HEART RATE: 66 BPM | SYSTOLIC BLOOD PRESSURE: 105 MMHG | TEMPERATURE: 97 F | OXYGEN SATURATION: 98 % | BODY MASS INDEX: 28 KG/M2 | RESPIRATION RATE: 18 BRPM

## 2024-01-11 DIAGNOSIS — Z00.00 ENCOUNTER FOR GENERAL ADULT MEDICAL EXAMINATION WITHOUT ABNORMAL FINDINGS: ICD-10-CM

## 2024-01-11 DIAGNOSIS — Z90.79 ACQUIRED ABSENCE OF OTHER GENITAL ORGAN(S): Chronic | ICD-10-CM

## 2024-01-11 DIAGNOSIS — I89.0 LYMPHEDEMA, NOT ELSEWHERE CLASSIFIED: ICD-10-CM

## 2024-01-11 DIAGNOSIS — I87.2 VENOUS INSUFFICIENCY (CHRONIC) (PERIPHERAL): ICD-10-CM

## 2024-01-11 PROCEDURE — 29580 STRAPPING UNNA BOOT: CPT

## 2024-01-11 PROCEDURE — G0463: CPT

## 2024-01-18 ENCOUNTER — OUTPATIENT (OUTPATIENT)
Dept: OUTPATIENT SERVICES | Facility: HOSPITAL | Age: 69
LOS: 1 days | End: 2024-01-18
Payer: MEDICARE

## 2024-01-18 ENCOUNTER — APPOINTMENT (OUTPATIENT)
Dept: WOUND CARE | Facility: CLINIC | Age: 69
End: 2024-01-18

## 2024-01-18 VITALS
WEIGHT: 200 LBS | OXYGEN SATURATION: 97 % | BODY MASS INDEX: 28 KG/M2 | HEART RATE: 72 BPM | RESPIRATION RATE: 18 BRPM | SYSTOLIC BLOOD PRESSURE: 114 MMHG | HEIGHT: 71 IN | TEMPERATURE: 98.7 F | DIASTOLIC BLOOD PRESSURE: 71 MMHG

## 2024-01-18 DIAGNOSIS — Z00.00 ENCOUNTER FOR GENERAL ADULT MEDICAL EXAMINATION WITHOUT ABNORMAL FINDINGS: ICD-10-CM

## 2024-01-18 DIAGNOSIS — Z90.79 ACQUIRED ABSENCE OF OTHER GENITAL ORGAN(S): Chronic | ICD-10-CM

## 2024-01-18 PROCEDURE — G0463: CPT

## 2024-01-18 PROCEDURE — 29580 STRAPPING UNNA BOOT: CPT

## 2024-01-18 NOTE — ASSESSMENT
[FreeTextEntry1] : Physical examination Vasc: DP and PT pulses palpable 2/4. TG warm to warm with no increase in temp to L leg wound Derm: Venous stasis ulcer noted. Measuring 13 cm x 10 cx x superficial. No Weeping noted. No fluctuance noted. No periwound erythema noted. Eshcar base with granular islands noted. Neuro: gross sensation intact Msk: Deferred  A:  Venous stasis wound L  Lymphedema   P:  Patient and patients chart were reviewed  Diagnosis was discussed with patient  Patient was educated about Unna boots and treament plan  Patient was advised to have VNS come once a week to change unna boot, otherwise keep dressing intact  Unna boot was applied to patients left leg, covered with coban  Patient was advised to not walk a lot while in unna boot Advised patient to follw-up with his vascular and primary care doctor. Referral to vascular doctor given,  Patient was told to keep dressing dry and to take off if he gets it wet.  Patient was educated on lymphedema pumps and Rep was messaged about patient.  Pt on disability until seen by vascular ED precautions were given as well as if his leg does start to hurt to take dressing off Patient to follow up in 1 week   Note Written by Madeleine Escobar PGY1

## 2024-01-18 NOTE — HISTORY OF PRESENT ILLNESS
[FreeTextEntry1] :  Patient presents to clinic for management of his leg wound. Patient states that he has been going to [facility] ED for this issue and they referred him here. Patient states that three weeks ago he went to [facility] and they gave him antibiotics which he has finished. He states that he does have VNS nurses that come every other day and wash his wounds with vashe, and wraps with xeroform, kerlix and ACE. They have been coming once a week to apply unna boot. Patient states his leg is not painful. Reports noticing improvement with the leg swelling after wearing unna boots. He has been expericng mild shooting pain to hgis legs after taking off the UNNA boot. He is scheduled to see qa vascular doctor soon. Pt waiting to hear back from rep for Lymphedema pumps. Patient does not have other pedal complaints.

## 2024-01-18 NOTE — ASSESSMENT
[FreeTextEntry1] : Physical examination Vasc: DP and PT pulses palpable 2/4. TG warm to warm with no increase in temp to L leg wound Derm: Venous stasis ulcer noted. Measuring 13 cm x 10 cx x superficial. No Weeping noted. No fluctuance noted. No periwound erythema noted. Eshcar base with granular islands noted. Neuro: gross sensation intact Msk: Deferred  A:  Venous stasis wound L  Lymphedema   P:  Patient and patients chart were reviewed  Diagnosis was discussed with patient  Patient was educated about Unna boots and treament plan  Patient was advised to have VNS come once a week to change unna boot, otherwise keep dressing intact  Unna boot was applied to patients left leg, covered with coban  Continue using lymphedema oum Advised patient to follw-up with his vascular and primary care doctor. Referral to vascular doctor given,  Patient was told to keep dressing dry and to take off if he gets it wet.  Patient was educated on lymphedema pumps and Rep was messaged about patient.  Pt on disability until seen by vascular ED precautions were given as well as if his leg does start to hurt to take dressing off Patient to follow up in 1 week   Note Written by Madeleine Escobar PGY1

## 2024-01-18 NOTE — HISTORY OF PRESENT ILLNESS
[FreeTextEntry1] : Patient presents to clinic for management of his leg wound. Patient states that he has been going to [facility] ED for this issue and they referred him here. Patient states that three weeks ago he went to [facility] and they gave him antibiotics which he has finished. He states that he does have VNS nurses that come every other day and wash his wounds with vashe, and wraps with xeroform, kerlix and ACE. They have been coming once a week to apply unna boot. Patient states his leg is not painful. Reports noticing improvement with the leg swelling after wearing unna boots. Pt recently got lymphedema pumps and finding it helpful for the swelling. Patient does not have other pedal complaints.

## 2024-01-19 DIAGNOSIS — I89.0 LYMPHEDEMA, NOT ELSEWHERE CLASSIFIED: ICD-10-CM

## 2024-01-25 ENCOUNTER — OUTPATIENT (OUTPATIENT)
Dept: OUTPATIENT SERVICES | Facility: HOSPITAL | Age: 69
LOS: 1 days | End: 2024-01-25
Payer: MEDICARE

## 2024-01-25 ENCOUNTER — APPOINTMENT (OUTPATIENT)
Dept: WOUND CARE | Facility: CLINIC | Age: 69
End: 2024-01-25

## 2024-01-25 VITALS
HEIGHT: 71 IN | SYSTOLIC BLOOD PRESSURE: 125 MMHG | DIASTOLIC BLOOD PRESSURE: 60 MMHG | BODY MASS INDEX: 28 KG/M2 | HEART RATE: 94 BPM | RESPIRATION RATE: 18 BRPM | TEMPERATURE: 97.7 F | OXYGEN SATURATION: 98 % | WEIGHT: 200 LBS

## 2024-01-25 DIAGNOSIS — Z00.00 ENCOUNTER FOR GENERAL ADULT MEDICAL EXAMINATION WITHOUT ABNORMAL FINDINGS: ICD-10-CM

## 2024-01-25 DIAGNOSIS — Z90.79 ACQUIRED ABSENCE OF OTHER GENITAL ORGAN(S): Chronic | ICD-10-CM

## 2024-01-25 PROCEDURE — G0463: CPT

## 2024-01-26 DIAGNOSIS — I89.0 LYMPHEDEMA, NOT ELSEWHERE CLASSIFIED: ICD-10-CM

## 2024-02-02 ENCOUNTER — APPOINTMENT (OUTPATIENT)
Dept: VASCULAR SURGERY | Facility: CLINIC | Age: 69
End: 2024-02-02
Payer: MEDICARE

## 2024-02-02 VITALS
BODY MASS INDEX: 28 KG/M2 | HEART RATE: 68 BPM | DIASTOLIC BLOOD PRESSURE: 81 MMHG | WEIGHT: 200 LBS | SYSTOLIC BLOOD PRESSURE: 138 MMHG | HEIGHT: 71 IN

## 2024-02-02 DIAGNOSIS — S81.802A UNSPECIFIED OPEN WOUND, LEFT LOWER LEG, INITIAL ENCOUNTER: ICD-10-CM

## 2024-02-02 DIAGNOSIS — I10 ESSENTIAL (PRIMARY) HYPERTENSION: ICD-10-CM

## 2024-02-02 DIAGNOSIS — Z78.9 OTHER SPECIFIED HEALTH STATUS: ICD-10-CM

## 2024-02-02 DIAGNOSIS — I87.8 OTHER SPECIFIED DISORDERS OF VEINS: ICD-10-CM

## 2024-02-02 DIAGNOSIS — C61 MALIGNANT NEOPLASM OF PROSTATE: ICD-10-CM

## 2024-02-02 PROCEDURE — 93971 EXTREMITY STUDY: CPT | Mod: LT

## 2024-02-02 PROCEDURE — 99204 OFFICE O/P NEW MOD 45 MIN: CPT

## 2024-02-02 NOTE — ASSESSMENT
[Arterial/Venous Disease] : arterial/venous disease [Medication Management] : medication management [FreeTextEntry1] : 58-year-old male with left lower extremity edema.  In the office today, patient underwent duplex which demonstrates no evidence of DVT, SVT and venous insufficiency.  Recommend compression and elevation.  Patient to continue use of lymphedema pump.  Patient to follow-up as needed.

## 2024-02-02 NOTE — HISTORY OF PRESENT ILLNESS
[FreeTextEntry1] : Patient is a 68-year-old male with history significant for prostate with lymph node dissection and subsequent lymphedema, hypertension, and hyperlipidemia who presents to the office today for evaluation of left lower extremity edema.  Patient had previous traumatic left lower extremity wound which is now healed.  Denies chest pain or shortness of breath.  Denies rest pain or claudication symptoms. No history of MI or CVA. No history of diabetes. No history of DVT or PE. No history of smoking.

## 2024-02-02 NOTE — PHYSICAL EXAM
[Normal Breath Sounds] : Normal breath sounds [Normal Rate and Rhythm] : normal rate and rhythm [2+] : left 2+ [Ankle Swelling (On Exam)] : present [] : of the left leg [Ankle Swelling On The Left] : moderate [No Rash or Lesion] : No rash or lesion [Alert] : alert [Calm] : calm [Varicose Veins Of Lower Extremities] : not present [de-identified] : Appears well [de-identified] : No palpable cords.  No calf tenderness. [de-identified] : Intact

## 2024-02-08 ENCOUNTER — APPOINTMENT (OUTPATIENT)
Dept: WOUND CARE | Facility: CLINIC | Age: 69
End: 2024-02-08

## 2024-02-08 ENCOUNTER — OUTPATIENT (OUTPATIENT)
Dept: OUTPATIENT SERVICES | Facility: HOSPITAL | Age: 69
LOS: 1 days | End: 2024-02-08
Payer: MEDICARE

## 2024-02-08 VITALS
WEIGHT: 200 LBS | HEIGHT: 71 IN | RESPIRATION RATE: 18 BRPM | HEART RATE: 72 BPM | OXYGEN SATURATION: 98 % | BODY MASS INDEX: 28 KG/M2 | DIASTOLIC BLOOD PRESSURE: 76 MMHG | SYSTOLIC BLOOD PRESSURE: 132 MMHG | TEMPERATURE: 97 F

## 2024-02-08 DIAGNOSIS — I89.0 LYMPHEDEMA, NOT ELSEWHERE CLASSIFIED: ICD-10-CM

## 2024-02-08 DIAGNOSIS — Z00.00 ENCOUNTER FOR GENERAL ADULT MEDICAL EXAMINATION WITHOUT ABNORMAL FINDINGS: ICD-10-CM

## 2024-02-08 DIAGNOSIS — Z90.79 ACQUIRED ABSENCE OF OTHER GENITAL ORGAN(S): Chronic | ICD-10-CM

## 2024-02-08 PROCEDURE — G0463: CPT

## 2024-02-08 NOTE — HISTORY OF PRESENT ILLNESS
[FreeTextEntry1] : Patient presents to clinic for management of his leg wound. Patient states that he has been going to [facility] ED for this issue and they referred him here. Patient states that three weeks ago he went to [facility] and they gave him antibiotics which he has finished. He states that he does have VNS nurses that come every other day and wash his wounds with vashe, and wraps with xeroform, kerlix and ACE. They have been coming once a week to apply unna boot. Patient states his leg is not painful. Reports noticing improvement with the leg swelling after wearing unna boots. Pt recently got lymphedema pumps and finding it helpful for the swelling. Patient does not have other pedal complaints.  Patient states that he has been using lymphedema pump as direct. He has a vascular doctor appointment on 2/2/24

## 2024-02-08 NOTE — HISTORY OF PRESENT ILLNESS
[FreeTextEntry1] : Patient presents to clinic for management of his leg wound. Patient states that he has been going to [facility] ED for this issue and they referred him here. Patient states that three weeks ago he went to [facility] and they gave him antibiotics which he has finished. He states that he does have VNS nurses that come every other day and wash his wounds with vashe, and wraps with xeroform, kerlix and ACE. They have been coming once a week to apply unna boot. Patient states his leg is not painful. Reports noticing improvement with the leg swelling after wearing unna boots. Pt recently got lymphedema pumps and finding it helpful for the swelling. Patient does not have other pedal complaints.  Patient states that he has been using lymphedema pump as direct. He had a vascular doctor appointment on 2/2/24, stable no vascular intervention needed.

## 2024-02-08 NOTE — ASSESSMENT
[FreeTextEntry1] : Physical examination Vasc: DP and PT pulses palpable 2/4. TG warm to warm with no increase in temp to L leg wound Derm: No open wound.No fluctuance noted. Callous noted to left submet 5 Neuro: gross sensation intact Msk: Deferred  A:  Venous stasis wound L  Lymphedema   P:  Patient and patients chart were reviewed  Diagnosis was discussed with patient  No open wounds, recommended compression stockings Continue using lymphedema pump Advised patient to follow-up with his vascular and primary care doctor. Referral to vascular doctor given, Appt on 2/2 Patient was educated on lymphedema pumps and Rep was messaged about patient.  Pt on disability until seen by vascular ED precautions were given as well as if his leg does start to hurt to take dressing off Patient to follow up in 2 week   Note Written by Alex Lane PGY1

## 2024-02-08 NOTE — ASSESSMENT
[FreeTextEntry1] : Physical examination Vasc: DP and PT pulses palpable 2/4. TG warm to warm with no increase in temp to L leg wound Derm: No open wound.No fluctuance noted. Callous noted to left submet 5 Neuro: gross sensation intact Msk: Deferred  A:  Venous stasis wound L  Lymphedema   P:  Patient and patients chart were reviewed  Diagnosis was discussed with patient  No open wounds, recommended compression stockings Continue using lymphedema pump Patient was educated on lymphedema pumps and Rep was messaged about patient.  Pt may return to work ED precautions were given as well as if his leg does start to hurt to take dressing off Patient to follow up in 4 week to monitor for closed wounds. following that he can RTC 6 weeks or longer  Note Written by Alex Lane PGY1

## 2024-03-07 ENCOUNTER — APPOINTMENT (OUTPATIENT)
Dept: WOUND CARE | Facility: CLINIC | Age: 69
End: 2024-03-07

## 2024-03-07 ENCOUNTER — OUTPATIENT (OUTPATIENT)
Dept: OUTPATIENT SERVICES | Facility: HOSPITAL | Age: 69
LOS: 1 days | End: 2024-03-07
Payer: MEDICARE

## 2024-03-07 VITALS
OXYGEN SATURATION: 98 % | TEMPERATURE: 98 F | BODY MASS INDEX: 28 KG/M2 | DIASTOLIC BLOOD PRESSURE: 75 MMHG | HEIGHT: 71 IN | SYSTOLIC BLOOD PRESSURE: 125 MMHG | HEART RATE: 68 BPM | RESPIRATION RATE: 18 BRPM | WEIGHT: 200 LBS

## 2024-03-07 DIAGNOSIS — Z00.00 ENCOUNTER FOR GENERAL ADULT MEDICAL EXAMINATION WITHOUT ABNORMAL FINDINGS: ICD-10-CM

## 2024-03-07 DIAGNOSIS — Z90.79 ACQUIRED ABSENCE OF OTHER GENITAL ORGAN(S): Chronic | ICD-10-CM

## 2024-03-07 PROCEDURE — G0463: CPT

## 2024-03-11 NOTE — ED ADULT NURSE NOTE - NS ED NURSE LEVEL OF CONSCIOUSNESS MENTAL STATUS
From: Yessi Gorman  To: Albina Lozano  Sent: 3/10/2024 11:07 AM CDT  Subject: Infusion Friday 3/15    Hello!  So I was just diagnosed with strep throat at Urgent Care and am starting a z-aiden. I am supposed to have my infusion on Friday, 3/15. I am guessing I should reschedule it? Please let me know what you recommend. Thank you.    Awake/Alert/Cooperative

## 2024-03-13 DIAGNOSIS — I87.2 VENOUS INSUFFICIENCY (CHRONIC) (PERIPHERAL): ICD-10-CM

## 2024-03-14 NOTE — PHYSICAL EXAM
[FreeTextEntry1] : medial leg [de-identified] : healed  No open wounds noted  [TWNoteComboBox1] : Left

## 2024-03-14 NOTE — HISTORY OF PRESENT ILLNESS
[FreeTextEntry1] : Patient presents to clinic for management of his leg wound. Patient states that he has been going to [facility] ED for this issue and they referred him here. Patient states that three weeks ago he went to [facility] and they gave him antibiotics which he has finished. Pt recently got lymphedema pumps and finding it helpful for the swelling. Patient does not have other pedal complaints. Patient has been using compression stockings bought over the counter. Patient was seen by vascular, stable no vascular intervention needed. A duplex was done by vascular, showed no clot. Patient denies constituional symptoms.

## 2024-03-14 NOTE — ASSESSMENT
[FreeTextEntry1] : Physical examination Vasc: DP and PT pulses palpable 2/4. TG warm to warm, CFT intact, + 2 pitting edema  Derm: No open wound. No fluctuance noted, no clinical signs of infection noted  Neuro: gross sensation intact Msk: No POP of L calf  A:  Venous stasis wound L  Lymphedema   P:  Patient and patients chart were reviewed  Diagnosis was discussed with patient  No open wounds, recommended compression stockings Continue using lymphedema pump Pt may return to work ED precautions given  RTC 6 weeks   Note Written by Sadie Rodriguez PGY-3

## 2024-04-11 ENCOUNTER — APPOINTMENT (OUTPATIENT)
Dept: WOUND CARE | Facility: CLINIC | Age: 69
End: 2024-04-11

## 2024-04-11 ENCOUNTER — OUTPATIENT (OUTPATIENT)
Dept: OUTPATIENT SERVICES | Facility: HOSPITAL | Age: 69
LOS: 1 days | End: 2024-04-11
Payer: MEDICARE

## 2024-04-11 VITALS
OXYGEN SATURATION: 100 % | BODY MASS INDEX: 29.68 KG/M2 | DIASTOLIC BLOOD PRESSURE: 78 MMHG | SYSTOLIC BLOOD PRESSURE: 138 MMHG | HEART RATE: 67 BPM | RESPIRATION RATE: 18 BRPM | WEIGHT: 212 LBS | TEMPERATURE: 97.5 F | HEIGHT: 71 IN

## 2024-04-11 DIAGNOSIS — Z00.00 ENCOUNTER FOR GENERAL ADULT MEDICAL EXAMINATION WITHOUT ABNORMAL FINDINGS: ICD-10-CM

## 2024-04-11 DIAGNOSIS — Z90.79 ACQUIRED ABSENCE OF OTHER GENITAL ORGAN(S): Chronic | ICD-10-CM

## 2024-04-11 PROCEDURE — G0463: CPT

## 2024-04-15 DIAGNOSIS — I87.8 OTHER SPECIFIED DISORDERS OF VEINS: ICD-10-CM

## 2024-04-15 DIAGNOSIS — I89.0 LYMPHEDEMA, NOT ELSEWHERE CLASSIFIED: ICD-10-CM

## 2024-04-18 NOTE — PHYSICAL EXAM
[FreeTextEntry1] : medial leg [de-identified] : healed  No open wounds noted  [TWNoteComboBox1] : Left

## 2024-04-18 NOTE — HISTORY OF PRESENT ILLNESS
[FreeTextEntry1] : Patient presents to clinic for management of his leg wound, now healed. Patient was originally referred from [facility] due to a superficial wound infection. Patient is planning a 14 day trip to [facility] in June, inquires about edema management Pt has been using the lymphedema pumps and finding it helpful for the swelling. Patient does not have other pedal complaints. Patient has been using compression stockings bought over the counter. Patient was seen by vascular, stable no vascular intervention needed. A duplex was done by vascular, showed no clot. Patient denies constitutional symptoms.

## 2024-04-18 NOTE — ASSESSMENT
[FreeTextEntry1] : Physical examination Vasc: DP and PT pulses palpable 2/4. TG warm to warm, CFT intact, + 2 pitting edema  Derm: No open wound. No fluctuance noted, no clinical signs of infection noted  Neuro: gross sensation intact Msk: No POP of L calf  A:  Venous stasis wound L  Lymphedema   P:  Patient evaluated and chart reviewed Diagnosis was discussed with patient  No open wounds, continue compression stockings Continue using lymphedema pump Pt may return to work ED precautions given  RTC 6 weeks

## 2024-05-23 ENCOUNTER — APPOINTMENT (OUTPATIENT)
Dept: WOUND CARE | Facility: CLINIC | Age: 69
End: 2024-05-23

## 2024-05-23 ENCOUNTER — OUTPATIENT (OUTPATIENT)
Dept: OUTPATIENT SERVICES | Facility: HOSPITAL | Age: 69
LOS: 1 days | End: 2024-05-23
Payer: MEDICARE

## 2024-05-23 VITALS
BODY MASS INDEX: 29.68 KG/M2 | RESPIRATION RATE: 18 BRPM | TEMPERATURE: 97.5 F | HEIGHT: 71 IN | OXYGEN SATURATION: 98 % | HEART RATE: 70 BPM | DIASTOLIC BLOOD PRESSURE: 73 MMHG | SYSTOLIC BLOOD PRESSURE: 125 MMHG | WEIGHT: 212 LBS

## 2024-05-23 DIAGNOSIS — Z90.79 ACQUIRED ABSENCE OF OTHER GENITAL ORGAN(S): Chronic | ICD-10-CM

## 2024-05-23 DIAGNOSIS — Z00.00 ENCOUNTER FOR GENERAL ADULT MEDICAL EXAMINATION WITHOUT ABNORMAL FINDINGS: ICD-10-CM

## 2024-05-23 PROCEDURE — G0463: CPT

## 2024-05-23 NOTE — ASSESSMENT
[FreeTextEntry1] : Physical examination Vasc: DP and PT pulses palpable 2/4. TG warm to warm, CFT intact, + 1 pitting edema  Derm: No open wound. No fluctuance noted, no clinical signs of infection noted  Neuro: gross sensation intact Msk: No POP of L calf  A:  Venous stasis wound L  Lymphedema   P:  Patient evaluated and chart reviewed Diagnosis was discussed with patient  No open wounds, continue compression stockings Continue using lymphedema pump Pt may return to work ED precautions given  RTC 6 weeks

## 2024-05-23 NOTE — PHYSICAL EXAM
[FreeTextEntry1] : medial leg [TWNoteComboBox1] : Left [de-identified] : healed  No open wounds noted

## 2024-05-23 NOTE — HISTORY OF PRESENT ILLNESS
[FreeTextEntry1] : Patient presents to clinic for management of his leg wound, now healed. Patient was originally referred from [facility] due to a superficial wound infection. Patient is planning a 14 day trip to [facility] in June, inquires about edema management Pt has been using the lymphedema pumps and finding it helpful for the swelling. Patient does not have other pedal complaints. Patient has been using compression stockings bought over the counter. Patient was seen by vascular, stable no vascular intervention needed. A duplex was done by vascular, showed no clot. Patient denies constitutional symptoms.

## 2024-05-24 DIAGNOSIS — I89.0 LYMPHEDEMA, NOT ELSEWHERE CLASSIFIED: ICD-10-CM

## 2024-09-19 NOTE — H&P ADULT - NSHPSOURCEINFORD_GEN_ALL_CORE
[facility] Heart and Vascular Cardiology    Patient Name: Wing Stone  Patient : 1956      Scribe Attestation  By signing my name below, I, Lory Robertson   attest that this documentation has been prepared under the direction and in the presence of Shailesh Dash DO.      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  The patient was last evaluated by me in 2024.  At that visit I ordered BMP/BNP/magnesium to be drawn in 6 months and asked the patient to follow-up in 6 months and sooner if necessary. BNP done on 24 showed normal serum sodium and potassium and a serum creatinine of 0.95. Serum magnesium was 2.00. BNP was 111. ECG done today showed sinus rhythm with a heart rate of 65 bpm.  The patient reports that he has been feeling generally well from the cardiac standpoint. He denies any new chest pain, shortness of breath, palpitations and lightheadedness. He states that he takes all of his medications as prescribed. During my exam, he was resting comfortably on the exam table.            Assessment/Plan:   1. HFmrEF  The patient has a history of HFmrEF with an ejection fraction of 45%.  Echocardiogram done on 2023 showed mildly decreased left ventricular systolic function with a 45% estimated ejection fraction, apical and anterolateral segments appear hypokinetic. There is mildly reduced right ventricular systolic function.  He does have 1+ pitting bilateral lower extremity edema on exam today. He states that his edema does not bother him.   He should continue his current cardiac medications.   Recent  lab works as noted in the HPI.  Lab works as noted below will be done in 6 months prior to his next visit.   I discussed with him the importance of following a low-sodium heart healthy diet as well as weight loss, wearing compression stockings and elevating legs when seated.   Follow up in 6 months and sooner if necessary.      2. Coronary artery disease  Patient has a history of coronary artery disease with prior bypass done in 2013.  ECG done today showed sinus rhythm with a heart rate of 65 bpm.    He denies anginal chest discomfort.  Blood pressure appears controlled on exam today.  He should continue his current antihypertensive medications.   Echocardiogram done on 2/16/2023 showed mildly decreased left ventricular systolic function with a 45% estimated ejection fraction, apical and anterolateral segments appear hypokinetic. There is mildly reduced right ventricular systolic function.  Recent lab works as noted in the HPI.   Lipid panel done in March 2024 showed an LDL of 48 and triglycerides of 89 currently on rosuvastatin 40 mg daily.   Lab works as noted below will be done in 6 months prior to his next visit.   Please also see lifestyle recommendations below.  Follow up in 6 months and sooner if necessary.      3. Hypertension  The patient has a history of hypertension which appears controlled on exam today.  He should continue his current antihypertensive medications and monitor his blood pressure at home.      4. Dyslipidemia  Lipid panel done in March 2024 showed an LDL of 48 and triglycerides of 89 currently on rosuvastatin 40 mg daily.   Lipid panel will be updated in 6 months.  Please see lifestyle recommendations below.     5. Obesity  Please see lifestyle recommendations below.    6. Carotid bruit  The patient was noted to have a right carotid bruit on exam today.  Carotid ultrasound was ordered as noted below.    Orders:   Carotid ultrasound  CMP/lipid/magnesium/CBC/BNP in 6 months,   Follow-up in  6 months.    Lifestyle Recommendations  I recommend a whole-food plant-based diet, an eating pattern that encourages the consumption of unrefined plant foods (such as fruits, vegetables, tubers, whole grains, legumes, nuts and seeds) and discourages meats, dairy products, eggs and processed foods.     The AHA/ACC recommends that the patient consume a dietary pattern that emphasizes intake of vegetables, fruits, and whole grains; includes low-fat dairy products, poultry, fish, legumes, non-tropical vegetable oils, and nuts; and limits intake of sodium, sweets, sugar-sweetened beverages, and red meats.  Adapt this dietary pattern to appropriate calorie requirements (a 500-750 kcal/day deficit to loose weight), personal and cultural food preferences, and nutrition therapy for other medical conditions (including diabetes).  Achieve this pattern by following plans such as the [place] Mediterranean, DASH dietary pattern, or AHA diet.     Engage in 2 hours and 30 minutes per week of moderate-intensity physical activity, or 1 hour and 15 minutes (75 minutes) per week of vigorous-intensity aerobic physical activity, or an equivalent combination of moderate and vigorous-intensity aerobic physical activity. Aerobic activity should be performed in episodes of at least 10 minutes preferably spread throughout the week.     Adhering to a heart healthy diet, regular exercise habits, avoidance of tobacco products, and maintenance of a healthy weight are crucial components of their heart disease risk reduction.     Any positive review of systems not specifically addressed in the office visit today should be evaluated and treated by the patients primary care physician or in an emergency department if necessary     Patient was notified that results from ordered tests will be called to the patient if it changes current management; it will otherwise be discussed at a future appointment and available on  [employer].     Thank you for  allowing me to participate in the care of this patient.        This document was generated using the assistance of voice recognition software. If there are any errors of spelling, grammar, syntax, or meaning; please feel free to contact me directly for clarification.    Past Medical History:  He has no past medical history on file.    Past Surgical History:  He has a past surgical history that includes Other surgical history (04/06/2022) and Other surgical history (08/11/2022).      Social History:  He reports that he has never smoked. He does not have any smokeless tobacco history on file. No history on file for alcohol use and drug use.    Family History:  No family history on file.     Allergies:  Patient has no allergy information on record.    Outpatient Medications:  Current Outpatient Medications   Medication Instructions    ascorbic acid (Vitamin C) 1,000 mg tablet oral    aspirin 81 mg EC tablet oral, Daily RT    carvedilol (COREG) 25 mg, oral, 2 times daily (morning and late afternoon)    carvedilol (COREG) 12.5 mg, oral, 2 times daily (morning and late afternoon)    cholecalciferol (Vitamin D-3) 50 mcg (2,000 unit) capsule oral    dapagliflozin propanediol (FARXIGA) 10 mg, oral, Daily before breakfast    ezetimibe (ZETIA) 10 mg, oral, Nightly    fish oil concentrate (Omega-3) 120-180 mg capsule oral    losartan (COZAAR) 50 mg, oral, Daily    naproxen sodium (Aleve) 220 mg tablet 1 tablet, oral, 3 times daily PRN    rosuvastatin (CRESTOR) 40 mg, oral, Daily    spironolactone (ALDACTONE) 25 mg, oral, Daily        ROS:  A 14 point review of systems was done and is negative other than as stated in HPI    Vitals:      3/28/2022     4:03 PM 4/4/2022     3:40 PM 5/13/2022     3:47 PM 8/11/2022     1:18 PM 2/23/2023    12:41 PM 9/22/2023     1:24 PM 3/22/2024     2:20 PM   Vitals   Systolic 134 150 146 118 130 148 144   Diastolic 80 80 88 70 82 74 74   Heart Rate 66 70 74 54 64 59 64   Resp 16 16 16 16     "  Height (in) 1.753 m (5' 9\") 1.753 m (5' 9\") 1.753 m (5' 9\") 1.753 m (5' 9\") 1.753 m (5' 9\") 1.753 m (5' 9\") 1.753 m (5' 9\")   Weight (lb) 256 245 259 255.13 249 245.38 246.8   BMI 37.8 kg/m2 36.18 kg/m2 38.25 kg/m2 37.68 kg/m2 36.77 kg/m2 36.24 kg/m2 36.45 kg/m2   BSA (m2) 2.38 m2 2.32 m2 2.39 m2 2.38 m2 2.35 m2 2.32 m2 2.34 m2        Physical Exam:   Constitutional: Cooperative, in no acute distress, alert, appears stated age.  Skin: Skin color, texture, turgor normal. No rashes or lesions.  Head: Normocephalic. No masses, lesions, tenderness or abnormalities  Eyes: Extraocular movements are grossly intact.  Mouth and throat: Mucous membranes moist  Neck: Neck supple, +right carotid bruit, no JVD  Respiratory: Lungs clear to auscultation, no wheezing or rhonchi, no use of accessory muscles  Chest wall: No scars, normal excursion with respiration  Cardiovascular: Regular rhythm without murmur  Gastrointestinal: Abdomen soft, nontender. Bowel sounds normal.  Musculoskeletal: Strength equal in upper extremities  Extremities: 1+ pitting edema  Neurologic: Sensation grossly intact, alert and oriented ×3        Intake/Output:   No intake/output data recorded.    Outpatient Medications  Current Outpatient Medications on File Prior to Visit   Medication Sig Dispense Refill    ascorbic acid (Vitamin C) 1,000 mg tablet Take by mouth.      aspirin 81 mg EC tablet Take by mouth once daily.      carvedilol (Coreg) 12.5 mg tablet TAKE 1 TABLET BY MOUTH TWICE DAILY WITH MEALS 180 tablet 3    carvedilol (Coreg) 25 mg tablet Take 1 tablet (25 mg) by mouth 2 times a day with meals.      cholecalciferol (Vitamin D-3) 50 mcg (2,000 unit) capsule Take by mouth.      dapagliflozin propanediol (Farxiga) 10 mg TAKE 1 TABLET BY MOUTH ONCE DAILY IN THE MORNING 90 tablet 1    ezetimibe (Zetia) 10 mg tablet TAKE 1 TABLET BY MOUTH AT BEDTIME 90 tablet 3    fish oil concentrate (Omega-3) 120-180 mg capsule Take by mouth.      losartan " (Cozaar) 50 mg tablet Take 1 tablet (50 mg) by mouth once daily. 90 tablet 1    naproxen sodium (Aleve) 220 mg tablet Take 1 tablet (220 mg) by mouth 3 times a day as needed.      rosuvastatin (Crestor) 40 mg tablet Take 1 tablet (40 mg) by mouth once daily. 90 tablet 3    spironolactone (Aldactone) 25 mg tablet Take 1 tablet (25 mg) by mouth once daily. 90 tablet 1     No current facility-administered medications on file prior to visit.       Labs: (past 26 weeks)  Recent Results (from the past 4368 hour(s))   Basic Metabolic Panel    Collection Time: 09/14/24  8:09 AM   Result Value Ref Range    Glucose 117 (H) 74 - 99 mg/dL    Sodium 138 136 - 145 mmol/L    Potassium 4.4 3.5 - 5.3 mmol/L    Chloride 106 98 - 107 mmol/L    Bicarbonate 26 21 - 32 mmol/L    Anion Gap 10 10 - 20 mmol/L    Urea Nitrogen 9 6 - 23 mg/dL    Creatinine 0.95 0.50 - 1.30 mg/dL    eGFR 87 >60 mL/min/1.73m*2    Calcium 8.8 8.6 - 10.3 mg/dL   B-Type Natriuretic Peptide    Collection Time: 09/14/24  8:09 AM   Result Value Ref Range     (H) 0 - 99 pg/mL   Magnesium    Collection Time: 09/14/24  8:09 AM   Result Value Ref Range    Magnesium 2.00 1.60 - 2.40 mg/dL       ECG  Encounter Date: 03/22/24   ECG 12 Lead    Narrative    Sinus rhythm heart rate 64 bpm.       Echocardiogram  No results found for this or any previous visit from the past 1095 days.      CV Studies:  EKG:  Encounter Date: 03/22/24   ECG 12 Lead    Narrative    Sinus rhythm heart rate 64 bpm.     Echocardiogram:   [place] [address]  Phone [phone] Fax [phone]    TRANSTHORACIC ECHOCARDIOGRAM REPORT      Patient Name:     DEBBIE Oleary Physician:  [id number] Jaleel Bowman MD  Study Date:       2/16/2023      Referring           JUAN DAVID AMBROSIO  Physician:  MRN/PID:          [id number]       PCP:  Accession/Order#: [id number]   [facility] Echo  Lab  Location:  YOB: 1956      Fellow:  Gender:           M              Nurse:              Delaney Patel RN  Admit Date:                      Sonographer:        Bharati Carolina RDCS  Admission Status: Outpatient     Additional Staff:  Height:           175.26 cm      CC Report to:  Weight:           108.86 kg      Study Type:         Echocardiogram  BSA:              2.23 m2  Blood Pressure: 118 /70 mmHg    Diagnosis/ICD: R94.31-Abnormal electrocardiogram [ECG] [EKG];  I25.10-Atherosclerotic heart disease of native coronary artery  without angina pectoris; I50.20-Unspecified systolic (congestive)  heart failure (CHF)  Indication:    Congestive Heart Failure, Abnormal EKG  Procedure/CPT: Echo Complete w Full Doppler-[id number]    Patient History:  Pertinent History: CAD, CABG (2013), HTN.    Study Detail: The following Echo studies were performed: 2D, M-Mode, Doppler and  color flow. Technically challenging study due to poor acoustic  windows. Optison used as a contrast agent for endocardial border  definition. Total contrast used for this procedure was 3 mL via IV  push.      PHYSICIAN INTERPRETATION:  Left Ventricle: Left ventricular systolic function is mildly decreased, with an estimated ejection fraction of 45%. Wall motion is abnormal. The left ventricular cavity size is normal. Left ventricular diastolic filling was not assessed. Apical and anterolateral segments appear hypokinetic.  Left Atrium: The left atrium is normal in size.  Right Ventricle: The right ventricle is normal in size. There is mildly reduced right ventricular systolic function.  Right Atrium: The right atrium is normal in size.  Aortic Valve: The aortic valve is trileaflet. There is no evidence of aortic valve regurgitation. The peak instantaneous gradient of the aortic valve is 7.4 mmHg. The mean gradient of the aortic valve is 4.1 mmHg.  Mitral Valve: The mitral valve is normal in structure. There is trace mitral valve  regurgitation.  Tricuspid Valve: The tricuspid valve is structurally normal. No evidence of tricuspid regurgitation.  Pulmonic Valve: The pulmonic valve is structurally normal. There is physiologic pulmonic valve regurgitation.  Pericardium: There is no pericardial effusion noted.  Aorta: The aortic root is normal.      CONCLUSIONS:  1. Left ventricular systolic function is mildly decreased with a 45% estimated ejection fraction.  2. Apical and anterolateral segments appear hypokinetic.  3. There is mildly reduced right ventricular systolic function.    QUANTITATIVE DATA SUMMARY:  2D MEASUREMENTS:  Normal Ranges:  LAs:           4.15 cm    (2.7-4.0cm)  IVSd:          1.04 cm    (0.6-1.1cm)  LVPWd:         1.00 cm    (0.6-1.1cm)  LVIDd:         6.26 cm    (3.9-5.9cm)  LVIDs:         5.26 cm  LV Mass Index: 121.8 g/m2  LV % FS        16.0 %    LA VOLUME:  Normal Ranges:  LA Vol A4C:        58.9 ml    (22+/-6mL/m2)  LA Vol A2C:        53.2 ml  LA Vol BP:         56.4 ml  LA Vol Index A4C:  26.4 ml/m2  LA Vol Index A2C:  23.8 ml/m2  LA Vol Index BP:   25.3 ml/m2  LA Area A4C:       20.9 cm2  LA Area A2C:       19.7 cm2  LA Major Axis A4C: 6.3 cm  LA Major Axis A2C: 6.2 cm  LA Volume Index:   25.3 ml/m2  LA Vol A4C:        55.7 ml  LA Vol A2C:        50.0 ml    RA VOLUME BY A/L METHOD:  Normal Ranges:  RA Area A4C: 18.5 cm2    AORTA MEASUREMENTS:  Normal Ranges:  Ao Sinus, d: 3.60 cm (2.1-3.5cm)  Ao STJ, d:   2.90 cm (1.7-3.4cm)  Asc Ao, d:   3.10 cm (2.1-3.4cm)    LV SYSTOLIC FUNCTION BY 2D PLANIMETRY (MOD):  Normal Ranges:  EF-A4C View: 45.1 % (>=55%)  EF-A2C View: 38.9 %  EF-Biplane:  44.1 %    LV DIASTOLIC FUNCTION:  Normal Ranges:  MV Peak E:        1.09 m/s    (0.7-1.2 m/s)  MV Peak A:        0.88 m/s    (0.42-0.7 m/s)  E/A Ratio:        1.24        (1.0-2.2)  MV A Dur:         121.79 msec  PulmV A Revs Dur: 163.65 msec    MITRAL VALVE:  Normal Ranges:  MV DT: 241 msec (150-240msec)    MITRAL INSUFFICIENCY:  Normal  Ranges:  PISA Radius:  0.5 cm  MR VTI:       167.73 cm  MR Vmax:      483.82 cm/s  MR Alias Yasmany: 39.3 cm/s  MR Volume:    24.08 ml  MR Flow Rt:   69.47 ml/s  MR EROA:      0.14 cm2  dP/dt:        855 mmHg/s  (>1200mmHg/sec)    AORTIC VALVE:  Normal Ranges:  AoV Vmax:                1.36 m/s (<=1.7m/s)  AoV Peak P.4 mmHg (<20mmHg)  AoV Mean P.1 mmHg (1.7-11.5mmHg)  LVOT Max Yasmany:            1.01 m/s (<=1.1m/s)  AoV VTI:                 32.26 cm (18-25cm)  LVOT VTI:                23.79 cm  LVOT Diameter:           2.24 cm  (1.8-2.4cm)  AoV Area, VTI:           2.90 cm2 (2.5-5.5cm2)  AoV Area,Vmax:           2.91 cm2 (2.5-4.5cm2)  AoV Dimensionless Index: 0.74    RIGHT VENTRICLE:  RV 1   3.4 cm  RV 2   2.5 cm  RV 3   7.7 cm  TAPSE: 19.6 mm  RV s'  0.11 m/s    TRICUSPID VALVE/RVSP:  Normal Ranges:  Peak TR Velocity: 2.24 m/s  RV Syst Pressure: 23.1 mmHg (< 30mmHg)  TV E Vmax:        0.46 m/s  (0.3-0.7m/s)  TV A Vmax:        0.45 m/s  IVC Diam:         1.60 cm  TV e'             0.1 m/s    Pulmonary Veins:  PulmV A Revs Dur: 163.65 msec    AORTA:  Asc Ao Diam 3.15 cm      [id number] Jaleel Bowman MD  Electronically signed on 2023 at 4:53:15 PM         Final     Stress Testing IMESULT([id number]:1:1825): No results found for this or any previous visit from the past  days.    Cardiac Catheterization:   Adult Cath     [facility], Cath Lab  [address] [place] [address]  Phone [phone] Fax [phone]    Cardiovascular Catheterization Report    Patient Name:     DEBBIE ORR Performing Physician: [id number] Andrea Azar MD  Study Date:       2022      Verifying Physician:  [id number] Andrea Azar MD  MRN/PID:          [id number]       Cardiologist:  Accession/Order#: [id number]      Referring Physician:  [id number] Shailesh Dash DO  YOB: 1956      Referring Physician:  Gender:           M              Referring Physician:      Study: Left Heart  Catheterization      Indications:  DEBBIE ORR is a 66 year old male who presents with hypertension, dyslipidemia, prior coronary artery bypass graft surgery and prior percutaneous coronary intervention. Cardiomyopathy and left ventricular dysfunction, with a chest pain assessment of atypical angina. Study performed as an elective cath procedure.    Medical History:  Stress test performed: No. CTA performed: No. Kendy accessed: No. LVEF Assessed: Yes.    Procedure Description:  After infiltration with 2% Lidocaine, the right femoral artery was cannulated with a modified Seldinger technique. Subsequently a 6 [place] sheath was placed in the right femoral artery. Selective coronary catheterization was performed using a 6 Fr catheter(s) exchanged over a guide wire to cannulate the coronary arteries. A JL 4 tip catheter was used for left coronary injections. A JR 4 tip catheter was used for right coronary injections.  Multiple injections of contrast were made into the left and right coronary arteries with angiograms recorded in multiple projections. After completion of the procedure, femoral artery angiography was performed. This demonstrated a common femoral artery puncture appropriate for closure. An Angio-Seal Evolution 6F ([employer]) vascular closure device was placed per protocol.    Coronary Angiography:  The coronary circulation is right dominant.    Left Main Coronary Artery:  The left main coronary artery is a normal caliber vessel. The left main arises normally from the left coronary sinus of Valsalva and bifurcates into the LAD and circumflex coronary arteries. The left main coronary artery showed no significant disease or stenosis greater than 30%.    Left Anterior Descending Coronary Artery Distribution:  The left anterior descending coronary artery is a normal caliber vessel. The LAD arises normally from the left main coronary artery. The LAD demonstrated chronic occlusion and total  Patient occlusion.    Circumflex Coronary Artery Distribution:  The circumflex coronary artery is a normal caliber vessel. The circumflex arises normally from the left main coronary artery and terminates in the AV groove. The circumflex revealed mild atherosclerotic disease.    Right Coronary Artery Distribution:    The right coronary artery is a normal caliber vessel. The RCA arises normally from the right sinus of Valsalva. The RCA showed moderate atherosclerotic disease and diffuse atherosclerotic disease.    Coronary Grafts:    LIMA Graft:  The left internal mammary artery graft conduit originating in situ and attached to the mid left anterior descending is widely patent.  Saphaneous Vein Graft(s):  The saphenous vein graft, originating from the aorta and attached to the 2nd obtuse marginal appeared totally occluded.  The 2nd saphenous vein graft, originating from the aorta and attached to the distal right coronary artery is patent.      Valve Findings:  No aortic valve stenosis is visualized.    Graft Stenosis Summary:  Graft     Destination of Graft  LIMA  mid left anterior descending  SVG 1 2nd obtuse marginal  SVG 2 distal right coronary artery        Complications:  No in-lab complications observed.    Cardiac Cath Transition of Care Summary:  Post Procedure Diagnosis: Triple vessel disease and Patent LIMA to LAD and SVG  to RCA.  Blood Loss:               Estimated blood loss during the procedure was 0 mls.  Specimens Removed:        Number of specimen(s) removed: none.      Recommendations:  Maximize medical therapy.    ____________________________________________________________________________________  CONCLUSIONS:  1. Triple vessel disease.  2. Patent SVG to RCA and LIMA to LAD and occluded SVG to OM.  3. Left Ventricular end-diastolic pressure = 15.  4. Normal LV filling pressures.    ____________________________________________________________________________________  CPT Codes:  Left Heart Cath Bypass Graft  w ventriculography and coronary angio([facility])-29086; Moderate Sedation Services initial 15 minutes patient >5 years-34945; Moderate Sedation Services 1st additional 15 minutes patient >5 years-20998    ICD 10 Codes:  I25.5-Ischemic cardiomyopathy    [id number] Andrea Azar MD  Performing Physician  Electronically signed by [id number] Andrea Azar MD on 4/21/2022 at 11:31:16 AM      cc Report to: [id number] Shailesh Dash DO           Final   No results found for this or any previous visit from the past 3650 days.     Cardiac Scoring: No results found for this or any previous visit from the past 1825 days.    AAA : No results found for this or any previous visit from the past 1825 days.    OTHER: No results found for this or any previous visit from the past 1825 days.    LAST IMAGING RESULTS  ECG 12 Lead  Sinus rhythm heart rate 64 bpm.      Problem List Items Addressed This Visit       CAD (coronary artery disease)    Dyslipidemia    Essential hypertension    Obesity (BMI 30-39.9)    Heart failure with mid-range ejection fraction (HFmEF) (Multi) - Primary            Shailesh Dash DO, FACC, FACOI